# Patient Record
Sex: MALE | Race: WHITE
[De-identification: names, ages, dates, MRNs, and addresses within clinical notes are randomized per-mention and may not be internally consistent; named-entity substitution may affect disease eponyms.]

---

## 2021-02-17 ENCOUNTER — HOSPITAL ENCOUNTER (INPATIENT)
Dept: HOSPITAL 47 - EC | Age: 61
LOS: 8 days | Discharge: HOME | DRG: 854 | End: 2021-02-25
Attending: SURGERY | Admitting: SURGERY
Payer: COMMERCIAL

## 2021-02-17 VITALS — BODY MASS INDEX: 21.4 KG/M2

## 2021-02-17 DIAGNOSIS — Z82.49: ICD-10-CM

## 2021-02-17 DIAGNOSIS — K50.114: ICD-10-CM

## 2021-02-17 DIAGNOSIS — K40.30: ICD-10-CM

## 2021-02-17 DIAGNOSIS — K63.2: ICD-10-CM

## 2021-02-17 DIAGNOSIS — Z87.891: ICD-10-CM

## 2021-02-17 DIAGNOSIS — A41.51: Primary | ICD-10-CM

## 2021-02-17 DIAGNOSIS — K56.7: ICD-10-CM

## 2021-02-17 DIAGNOSIS — J98.11: ICD-10-CM

## 2021-02-17 DIAGNOSIS — Z20.822: ICD-10-CM

## 2021-02-17 DIAGNOSIS — K50.112: ICD-10-CM

## 2021-02-17 DIAGNOSIS — Z91.19: ICD-10-CM

## 2021-02-17 DIAGNOSIS — D50.9: ICD-10-CM

## 2021-02-17 DIAGNOSIS — Z53.31: ICD-10-CM

## 2021-02-17 DIAGNOSIS — J90: ICD-10-CM

## 2021-02-17 DIAGNOSIS — K21.9: ICD-10-CM

## 2021-02-17 DIAGNOSIS — E83.42: ICD-10-CM

## 2021-02-17 DIAGNOSIS — R18.8: ICD-10-CM

## 2021-02-17 DIAGNOSIS — Z91.011: ICD-10-CM

## 2021-02-17 DIAGNOSIS — Z82.3: ICD-10-CM

## 2021-02-17 LAB
ALBUMIN SERPL-MCNC: 3.9 G/DL (ref 3.5–5)
ALP SERPL-CCNC: 109 U/L (ref 38–126)
ALT SERPL-CCNC: 20 U/L (ref 4–49)
ANION GAP SERPL CALC-SCNC: 14 MMOL/L
APTT BLD: 22.7 SEC (ref 22–30)
AST SERPL-CCNC: 30 U/L (ref 17–59)
BASOPHILS # BLD AUTO: 0.1 K/UL (ref 0–0.2)
BASOPHILS NFR BLD AUTO: 1 %
BUN SERPL-SCNC: 17 MG/DL (ref 9–20)
CALCIUM SPEC-MCNC: 9.2 MG/DL (ref 8.4–10.2)
CHLORIDE SERPL-SCNC: 99 MMOL/L (ref 98–107)
CO2 SERPL-SCNC: 20 MMOL/L (ref 22–30)
EOSINOPHIL # BLD AUTO: 0.1 K/UL (ref 0–0.7)
EOSINOPHIL NFR BLD AUTO: 0 %
ERYTHROCYTE [DISTWIDTH] IN BLOOD BY AUTOMATED COUNT: 4.67 M/UL (ref 4.3–5.9)
ERYTHROCYTE [DISTWIDTH] IN BLOOD: 13.5 % (ref 11.5–15.5)
GLUCOSE SERPL-MCNC: 105 MG/DL (ref 74–99)
HCT VFR BLD AUTO: 37.8 % (ref 39–53)
HGB BLD-MCNC: 12.8 GM/DL (ref 13–17.5)
INR PPP: 1 (ref ?–1.2)
LYMPHOCYTES # SPEC AUTO: 1.2 K/UL (ref 1–4.8)
LYMPHOCYTES NFR SPEC AUTO: 9 %
MCH RBC QN AUTO: 27.3 PG (ref 25–35)
MCHC RBC AUTO-ENTMCNC: 33.7 G/DL (ref 31–37)
MCV RBC AUTO: 81 FL (ref 80–100)
MONOCYTES # BLD AUTO: 0.8 K/UL (ref 0–1)
MONOCYTES NFR BLD AUTO: 6 %
NEUTROPHILS # BLD AUTO: 10.7 K/UL (ref 1.3–7.7)
NEUTROPHILS NFR BLD AUTO: 83 %
PLATELET # BLD AUTO: 463 K/UL (ref 150–450)
POTASSIUM SERPL-SCNC: 4.2 MMOL/L (ref 3.5–5.1)
PROT SERPL-MCNC: 8.3 G/DL (ref 6.3–8.2)
PT BLD: 11.1 SEC (ref 9–12)
SODIUM SERPL-SCNC: 133 MMOL/L (ref 137–145)
WBC # BLD AUTO: 12.8 K/UL (ref 3.8–10.6)

## 2021-02-17 PROCEDURE — 83540 ASSAY OF IRON: CPT

## 2021-02-17 PROCEDURE — 86850 RBC ANTIBODY SCREEN: CPT

## 2021-02-17 PROCEDURE — 87186 SC STD MICRODIL/AGAR DIL: CPT

## 2021-02-17 PROCEDURE — 82728 ASSAY OF FERRITIN: CPT

## 2021-02-17 PROCEDURE — 86901 BLOOD TYPING SEROLOGIC RH(D): CPT

## 2021-02-17 PROCEDURE — 80053 COMPREHEN METABOLIC PANEL: CPT

## 2021-02-17 PROCEDURE — 87077 CULTURE AEROBIC IDENTIFY: CPT

## 2021-02-17 PROCEDURE — 96376 TX/PRO/DX INJ SAME DRUG ADON: CPT

## 2021-02-17 PROCEDURE — 84100 ASSAY OF PHOSPHORUS: CPT

## 2021-02-17 PROCEDURE — 87635 SARS-COV-2 COVID-19 AMP PRB: CPT

## 2021-02-17 PROCEDURE — 87075 CULTR BACTERIA EXCEPT BLOOD: CPT

## 2021-02-17 PROCEDURE — 99285 EMERGENCY DEPT VISIT HI MDM: CPT

## 2021-02-17 PROCEDURE — 83735 ASSAY OF MAGNESIUM: CPT

## 2021-02-17 PROCEDURE — 87070 CULTURE OTHR SPECIMN AEROBIC: CPT

## 2021-02-17 PROCEDURE — 74018 RADEX ABDOMEN 1 VIEW: CPT

## 2021-02-17 PROCEDURE — 85610 PROTHROMBIN TIME: CPT

## 2021-02-17 PROCEDURE — 93005 ELECTROCARDIOGRAM TRACING: CPT

## 2021-02-17 PROCEDURE — 36415 COLL VENOUS BLD VENIPUNCTURE: CPT

## 2021-02-17 PROCEDURE — 87205 SMEAR GRAM STAIN: CPT

## 2021-02-17 PROCEDURE — 87040 BLOOD CULTURE FOR BACTERIA: CPT

## 2021-02-17 PROCEDURE — 85652 RBC SED RATE AUTOMATED: CPT

## 2021-02-17 PROCEDURE — 74177 CT ABD & PELVIS W/CONTRAST: CPT

## 2021-02-17 PROCEDURE — 88307 TISSUE EXAM BY PATHOLOGIST: CPT

## 2021-02-17 PROCEDURE — 85025 COMPLETE CBC W/AUTO DIFF WBC: CPT

## 2021-02-17 PROCEDURE — 86140 C-REACTIVE PROTEIN: CPT

## 2021-02-17 PROCEDURE — 93306 TTE W/DOPPLER COMPLETE: CPT

## 2021-02-17 PROCEDURE — 96374 THER/PROPH/DIAG INJ IV PUSH: CPT

## 2021-02-17 PROCEDURE — 85730 THROMBOPLASTIN TIME PARTIAL: CPT

## 2021-02-17 PROCEDURE — 83550 IRON BINDING TEST: CPT

## 2021-02-17 PROCEDURE — 86900 BLOOD TYPING SEROLOGIC ABO: CPT

## 2021-02-17 PROCEDURE — 80048 BASIC METABOLIC PNL TOTAL CA: CPT

## 2021-02-17 RX ADMIN — CEFAZOLIN SCH MLS/HR: 330 INJECTION, POWDER, FOR SOLUTION INTRAMUSCULAR; INTRAVENOUS at 21:24

## 2021-02-17 RX ADMIN — HYDROMORPHONE HYDROCHLORIDE PRN MG: 1 INJECTION, SOLUTION INTRAMUSCULAR; INTRAVENOUS; SUBCUTANEOUS at 22:21

## 2021-02-17 NOTE — ED
General Adult HPI





- General


Chief complaint: Abdominal Pain


Stated complaint: Hernia


Time Seen by Provider: 02/17/21 17:46


Source: patient, RN notes reviewed


Mode of arrival: ambulatory


Limitations: no limitations





- History of Present Illness


Initial comments: 





Patient is a pleasant 60-year-old male presenting to the emergency Department 

with inguinal hernia.  Onset of symptoms was close to weeks ago.  Symptoms are 

intermittent however worsening.  Patient states symptoms have been much worse 

the past couple of days.  Patient states it is swollen and painful.  It 

increases with touch and movement.  No history of similar symptoms previously.  

Patient does have decreased appetite.  Patient is still passing gas.





- Related Data


                                    Allergies











Allergy/AdvReac Type Severity Reaction Status Date / Time


 


No Known Allergies Allergy   Verified 02/17/21 17:33














Review of Systems


ROS Statement: 


Those systems with pertinent positive or pertinent negative responses have been 

documented in the HPI.





ROS Other: All systems not noted in ROS Statement are negative.


Constitutional: Denies: fever


Eyes: Denies: eye pain


ENT: Denies: ear pain


Respiratory: Denies: cough


Cardiovascular: Denies: chest pain


Endocrine: Denies: fatigue


Gastrointestinal: Reports: as per HPI


Genitourinary: Reports: as per HPI


Musculoskeletal: Denies: back pain


Skin: Denies: rash


Neurological: Denies: weakness





Past Medical History


Additional Past Medical History / Comment(s): collitis, chrons


History of Any Multi-Drug Resistant Organisms: None Reported


Past Surgical History: No Surgical Hx Reported


Past Psychological History: No Psychological Hx Reported


Smoking Status: Former smoker


Past Alcohol Use History: None Reported


Past Drug Use History: None Reported





General Exam


Limitations: no limitations


General appearance: alert, in no apparent distress


Head exam: Present: normocephalic


Eye exam: Present: normal appearance


Neck exam: Present: normal inspection


Respiratory exam: Present: normal lung sounds bilaterally


Cardiovascular Exam: Present: regular rate, normal rhythm


GI/Abdominal exam: Present: soft.  Absent: distended, tenderness, guarding


 exam: Present: other (Left inguinal hernia with tenderness.  There is mild 

warmth and erythema laterally)


Extremities exam: Present: normal inspection


Back exam: Present: normal inspection


Neurological exam: Present: alert


Psychiatric exam: Present: normal affect, normal mood


Skin exam: Present: normal color





Course


                                   Vital Signs











  02/17/21 02/17/21





  17:30 19:37


 


Temperature 98.9 F 


 


Pulse Rate 115 H 99


 


Respiratory 18 18





Rate  


 


Blood Pressure 123/77 129/70


 


O2 Sat by Pulse 100 99





Oximetry  














- Reevaluation(s)


Reevaluation #1: 





02/17/21 17:59


Secondary to discomfort unable to reduce without pain medication at this time.





Medical Decision Making





- Medical Decision Making





Despite 2 doses of pain medication and 3 attempts at reduction, only a small 

amount of hernia was able to be reduced.  There is still approximately 50% or 

more present.  Patient is having discomfort with procedure.  Case was discussed 

with Dr. soria, who will admit, she is aware of concern of incarcerated vs 

strangulate hernia.  She would like IV antibiotics, 1 L normal saline, EKG and 

nothing by mouth after midnight





- Lab Data


Result diagrams: 


                                 02/17/21 18:18





                                 02/17/21 18:18


                                   Lab Results











  02/17/21 02/17/21 02/17/21 Range/Units





  18:18 18:18 18:18 


 


WBC  12.8 H    (3.8-10.6)  k/uL


 


RBC  4.67    (4.30-5.90)  m/uL


 


Hgb  12.8 L    (13.0-17.5)  gm/dL


 


Hct  37.8 L    (39.0-53.0)  %


 


MCV  81.0    (80.0-100.0)  fL


 


MCH  27.3    (25.0-35.0)  pg


 


MCHC  33.7    (31.0-37.0)  g/dL


 


RDW  13.5    (11.5-15.5)  %


 


Plt Count  463 H    (150-450)  k/uL


 


MPV  6.7    


 


Neutrophils %  83    %


 


Lymphocytes %  9    %


 


Monocytes %  6    %


 


Eosinophils %  0    %


 


Basophils %  1    %


 


Neutrophils #  10.7 H    (1.3-7.7)  k/uL


 


Lymphocytes #  1.2    (1.0-4.8)  k/uL


 


Monocytes #  0.8    (0-1.0)  k/uL


 


Eosinophils #  0.1    (0-0.7)  k/uL


 


Basophils #  0.1    (0-0.2)  k/uL


 


PT   11.1   (9.0-12.0)  sec


 


INR   1.0   (<1.2)  


 


APTT   22.7   (22.0-30.0)  sec


 


Sodium    133 L  (137-145)  mmol/L


 


Potassium    4.2  (3.5-5.1)  mmol/L


 


Chloride    99  ()  mmol/L


 


Carbon Dioxide    20 L  (22-30)  mmol/L


 


Anion Gap    14  mmol/L


 


BUN    17  (9-20)  mg/dL


 


Creatinine    0.92  (0.66-1.25)  mg/dL


 


Est GFR (CKD-EPI)AfAm    >90  (>60 ml/min/1.73 sqM)  


 


Est GFR (CKD-EPI)NonAf    >90  (>60 ml/min/1.73 sqM)  


 


Glucose    105 H  (74-99)  mg/dL


 


Calcium    9.2  (8.4-10.2)  mg/dL


 


Total Bilirubin    1.2  (0.2-1.3)  mg/dL


 


AST    30  (17-59)  U/L


 


ALT    20  (4-49)  U/L


 


Alkaline Phosphatase    109  ()  U/L


 


Total Protein    8.3 H  (6.3-8.2)  g/dL


 


Albumin    3.9  (3.5-5.0)  g/dL














- Radiology Data


Radiology results: image reviewed (Abdominal x-ray shows nonacute abdomen)





Disposition


Clinical Impression: 


 Incarcerated hernia





Disposition: ADMITTED AS IP TO THIS [facility]


Condition: Serious


Is patient prescribed a controlled substance at d/c from ED?: No


Referrals: 


Nonstaff,Physician [Primary Care Provider] - 1-2 days


Decision Time: 19:40

## 2021-02-17 NOTE — XR
EXAMINATION TYPE: XR abdomen 1V

 

DATE OF EXAM: 2/17/2021

 

COMPARISON: NONE

 

HISTORY: Hernia. Abdominal pain.

 

TECHNIQUE: 2 views

 

FINDINGS: 2 views upright were obtained and show no sign of intestinal obstruction or pneumoperitoneu
m. Fecal pattern is normal. Lung bases are clear. There are no pathologic calcifications.

 

IMPRESSION: Nonacute abdomen.

## 2021-02-18 LAB
ANION GAP SERPL CALC-SCNC: 9.9 MMOL/L (ref 4–12)
BASOPHILS # BLD AUTO: 0.07 X 10*3/UL (ref 0–0.1)
BASOPHILS NFR BLD AUTO: 0.5 %
BUN SERPL-SCNC: 16 MG/DL (ref 9–27)
BUN/CREAT SERPL: 17.78 RATIO (ref 12–20)
CALCIUM SPEC-MCNC: 8 MG/DL (ref 8.7–10.3)
CHLORIDE SERPL-SCNC: 103 MMOL/L (ref 96–109)
CO2 SERPL-SCNC: 23.1 MMOL/L (ref 21.6–31.8)
EOSINOPHIL # BLD AUTO: 0.09 X 10*3/UL (ref 0.04–0.35)
EOSINOPHIL NFR BLD AUTO: 0.7 %
ERYTHROCYTE [DISTWIDTH] IN BLOOD BY AUTOMATED COUNT: 3.77 X 10*6/UL (ref 4.4–5.6)
ERYTHROCYTE [DISTWIDTH] IN BLOOD: 13.7 % (ref 11.5–14.5)
FERRITIN SERPL-MCNC: 162.2 NG/ML (ref 22–322)
GLUCOSE SERPL-MCNC: 98 MG/DL (ref 70–110)
HCT VFR BLD AUTO: 31.4 % (ref 39.6–50)
HGB BLD-MCNC: 10.2 G/DL (ref 13–17)
IRON SERPL-MCNC: 11 UG/DL (ref 65–175)
LYMPHOCYTES # SPEC AUTO: 1.14 X 10*3/UL (ref 0.9–5)
LYMPHOCYTES NFR SPEC AUTO: 8.5 %
MCH RBC QN AUTO: 27.1 PG (ref 27–32)
MCHC RBC AUTO-ENTMCNC: 32.5 G/DL (ref 32–37)
MCV RBC AUTO: 83.3 FL (ref 80–97)
MONOCYTES # BLD AUTO: 1.16 X 10*3/UL (ref 0.2–1)
MONOCYTES NFR BLD AUTO: 8.7 %
NEUTROPHILS # BLD AUTO: 10.88 X 10*3/UL (ref 1.8–7.7)
NEUTROPHILS NFR BLD AUTO: 81.2 %
PLATELET # BLD AUTO: 367 X 10*3/UL (ref 140–440)
POTASSIUM SERPL-SCNC: 4.2 MMOL/L (ref 3.5–5.5)
SODIUM SERPL-SCNC: 136 MMOL/L (ref 135–145)
TIBC SERPL-MCNC: 235 UG/DL (ref 228–460)
WBC # BLD AUTO: 13.4 X 10*3/UL (ref 4.5–10)

## 2021-02-18 RX ADMIN — CEFAZOLIN SCH: 330 INJECTION, POWDER, FOR SOLUTION INTRAMUSCULAR; INTRAVENOUS at 22:39

## 2021-02-18 RX ADMIN — ENOXAPARIN SODIUM SCH MG: 30 INJECTION SUBCUTANEOUS at 08:19

## 2021-02-18 RX ADMIN — CEFAZOLIN SCH MLS/HR: 330 INJECTION, POWDER, FOR SOLUTION INTRAMUSCULAR; INTRAVENOUS at 13:00

## 2021-02-18 RX ADMIN — GABAPENTIN SCH MG: 300 CAPSULE ORAL at 22:40

## 2021-02-18 RX ADMIN — AMPICILLIN SODIUM AND SULBACTAM SODIUM SCH MLS/HR: 1; .5 INJECTION, POWDER, FOR SOLUTION INTRAMUSCULAR; INTRAVENOUS at 08:19

## 2021-02-18 RX ADMIN — CEFAZOLIN SCH: 330 INJECTION, POWDER, FOR SOLUTION INTRAMUSCULAR; INTRAVENOUS at 03:24

## 2021-02-18 RX ADMIN — METRONIDAZOLE SCH MLS/HR: 500 INJECTION, SOLUTION INTRAVENOUS at 17:04

## 2021-02-18 RX ADMIN — SODIUM FERRIC GLUCONATE COMPLEX SCH MLS/HR: 12.5 INJECTION INTRAVENOUS at 23:06

## 2021-02-18 RX ADMIN — METRONIDAZOLE SCH MLS/HR: 500 INJECTION, SOLUTION INTRAVENOUS at 23:46

## 2021-02-18 RX ADMIN — KETOROLAC TROMETHAMINE SCH MG: 15 INJECTION, SOLUTION INTRAMUSCULAR; INTRAVENOUS at 23:44

## 2021-02-18 RX ADMIN — PIPERACILLIN AND TAZOBACTAM SCH MLS/HR: 3; .375 INJECTION, POWDER, FOR SOLUTION INTRAVENOUS at 12:58

## 2021-02-18 RX ADMIN — HYDROMORPHONE HYDROCHLORIDE PRN MG: 1 INJECTION, SOLUTION INTRAMUSCULAR; INTRAVENOUS; SUBCUTANEOUS at 22:40

## 2021-02-18 RX ADMIN — METRONIDAZOLE SCH MLS/HR: 500 INJECTION, SOLUTION INTRAVENOUS at 11:29

## 2021-02-18 RX ADMIN — ACETAMINOPHEN SCH MG: 500 TABLET ORAL at 11:53

## 2021-02-18 RX ADMIN — KETOROLAC TROMETHAMINE SCH MG: 15 INJECTION, SOLUTION INTRAMUSCULAR; INTRAVENOUS at 17:35

## 2021-02-18 RX ADMIN — ACETAMINOPHEN SCH MG: 500 TABLET ORAL at 17:35

## 2021-02-18 RX ADMIN — ACETAMINOPHEN SCH: 500 TABLET ORAL at 00:03

## 2021-02-18 RX ADMIN — ACETAMINOPHEN SCH: 500 TABLET ORAL at 05:04

## 2021-02-18 RX ADMIN — KETOROLAC TROMETHAMINE SCH MG: 15 INJECTION, SOLUTION INTRAMUSCULAR; INTRAVENOUS at 05:07

## 2021-02-18 RX ADMIN — TAMSULOSIN HYDROCHLORIDE SCH MG: 0.4 CAPSULE ORAL at 08:19

## 2021-02-18 RX ADMIN — ACETAMINOPHEN SCH: 500 TABLET ORAL at 23:52

## 2021-02-18 RX ADMIN — PANTOPRAZOLE SODIUM SCH MG: 40 INJECTION, POWDER, FOR SOLUTION INTRAVENOUS at 08:18

## 2021-02-18 RX ADMIN — KETOROLAC TROMETHAMINE SCH MG: 15 INJECTION, SOLUTION INTRAMUSCULAR; INTRAVENOUS at 11:54

## 2021-02-18 RX ADMIN — GABAPENTIN SCH MG: 300 CAPSULE ORAL at 08:19

## 2021-02-18 RX ADMIN — AMPICILLIN SODIUM AND SULBACTAM SODIUM SCH MLS/HR: 1; .5 INJECTION, POWDER, FOR SOLUTION INTRAMUSCULAR; INTRAVENOUS at 02:52

## 2021-02-18 RX ADMIN — KETOROLAC TROMETHAMINE SCH MG: 15 INJECTION, SOLUTION INTRAMUSCULAR; INTRAVENOUS at 00:07

## 2021-02-18 RX ADMIN — PIPERACILLIN AND TAZOBACTAM SCH MLS/HR: 3; .375 INJECTION, POWDER, FOR SOLUTION INTRAVENOUS at 19:22

## 2021-02-18 RX ADMIN — GABAPENTIN SCH MG: 300 CAPSULE ORAL at 16:08

## 2021-02-18 NOTE — P.GSHP
History of Present Illness


H&P Date: 02/18/21








CHIEF COMPLAINT: Left groin swelling with redness





HISTORY OF PRESENT ILLNESS: The patient is a 60-year-old male with over 40+ year

history of Crohn's disease.  He reports in the last 2 weeks having tenderness 

along the left groin.  He denied any noticeable swelling or bulge along the left

groin.  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  He is from [facility].  He has seen his primary care 

doctor the last 2 days regarding the redness along the left groin.  Upon 

inspection, patient was immediately advised to go to the emergency room for 

surgical intervention.  In the ER, reduction of a left groin hernia was 

attempted but unsuccessful.  Patient denies any previous history of abdominal 

wall hernias.  Denies any abdominal surgeries.  He reports being on infusion 

therapies over 30-20 years ago which had been discontinued.  He has been using 

homeopathic medications to control his Crohn's disease.  He does not follow up 

with a gastroenterologist.  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

 Denies any recent colonoscopy in over 22 years.  He has family history of heart

disease including strokes in his grandparents and father.  Denies any active 

chest pain.  He denies taking any home medications.  He is admitted for in

[facility] with a left inguinal hernia





PAST MEDICAL HISTORY: 


See list and reviewed





PAST SURGICAL HISTORY: 


See list and reviewed





MEDICATIONS: 


See list and reviewed





ALLERGIES: 


See list and reviewed





SOCIAL HISTORY: See list and reviewed





FAMILY HISTORY:  See list and reviewed





REVIEW OF ORGAN SYSTEMS:


CONSTITUTIONAL:  Inpatient, fevers over 100.0


EYES: Denies any trouble with vision. No glasses.


HEENT:  No difficulties with hearing. No nosebleeds.  No difficulty swallowing. 


RESPIRATORY:  Denies pneumonia. Denies any troubles with breathing or dyspnea on

exertion. 


CARDIOVASCULAR:  Denies any chest pain, palpitations, or recent heart attacks. 


GASTROINTESTINAL: Has Crohn's disease over 40 years treated with homeopathic 

medications.  No recent colonoscopy 22 years.


GENITOURINARY:  Denies any blood in urine or increased urinary frequency.  


NEUROLOGICAL:  Denies any numbness or tingling along the distal extremities. No 

seizure disorders or headaches.


MUSCULOSKELETAL:  Denies any back pain, stiffness or joint arthritis. 


SKIN: No current skin cancer. No rash.


PSYCHIATRIC:  Denies current depression or suicidal thoughts.


ENDOCRINE:  Denies current thyroid disorders. Denies any blood sugar glucose 

intolerance.


HEME/LYMPHATIC: Denies any lumps and bumps around the neck. No recent deep 

venous thrombosis.


ALLERGY/IMMUNOLOGY:  No immunoglobulin therapy. No immune deficiencies.


BREAST: Denies current breast lumps, pain or nipple discharge.





PHYSICAL EXAM:


VITALS: Reviewed


CONSTITUTIONAL:  Well developed and in no acute distress. 


EYES:  Conjuctivae without sclera icterus. Pupils are equally round and reactive

to light. Extraocular movements grossly intact. 


HEAD, EARS, NOSE, THROAT: Moist buccal mucosa. Head is atraumatic, 

normocephalic. Hears conversational speech. No nasal drainage. 


NECK:  Supple. No JV distention. No thyroidomegaly.


RESPIRATORY:  Non-labored respirations and equal bilateral excursions. No gross 

wheezes. 


CARDIOVASCULAR:  Regular rate and rhythm.  Extremities without edema. Palpable 

2+ radial pulses.


ABDOMEN:  Soft.  Swelling along left groin erythematous of 10 x 4 cm.  No 

peritonitis.


LYMPH: No neck lymphadenopathy. 


MUSCULOSKELETAL: Nail and fingers with good capillary refill.  Has clubbing 

along the fingers. 


SKIN:  Warm and well perfused with good skin turgor.


NEUROLOGIC: Cranial nerves II through XII grossly intact. Sensation upper and 

extremities intact. No focal or lateralizing signs. 


PSYCH:  Appropriate affect.  Alert and oriented to person, place and time. 

Displays appropriate insight.





CLINCAL LABS: Reviewed.  WBC elevated from over 12,000-13,000 today.  Hemoglobin

down from 12.6 to over 10.0.  Coronavirus negative.





STUDIES: Plain abdominal films to review reviewed demonstrating barstool on the 

right colon.  No free air.  Localized small bowel dilation of the left upper 

quadrant.  Air is within the colon.





EKG: Normal sinus rhythm.





ASSESSMENT: 


1.  Left lower quadrant abdominal pain with left groin swelling 


2.  Crohn's disease untreated





PLAN: 


1.  Recommend stat CT of the abdomen pelvis is presentation of routine inguinal 

hernia is complicated with uncontrolled Crohn's disease with fevers and leukocy

tosis


2.  Recommend stat iron panel including CRP for iron deficiency anemia and 

active Crohn's disease.


3.  GI consultation for uncontrolled Crohn's disease


4.  Overall presentation complicated with active Crohn's including leukocytosis 

and moderate sized swelling with erythema along the left groin questionable for 

complicated Crohn's disease with abscess


5.  Full inpatient admission described to evaluate for complicated Crohn's 

disease with left lower quadrant abdominal pain and questionable intra-abdominal

abscess





Past Medical History


Additional Past Medical History / Comment(s): collitis, chrons


History of Any Multi-Drug Resistant Organisms: None Reported


Past Surgical History: No Surgical Hx Reported


Past Psychological History: No Psychological Hx Reported


Smoking Status: Never smoker


Past Alcohol Use History: Occasional


Past Drug Use History: None Reported





Medications and Allergies


                                Home Medications











 Medication  Instructions  Recorded  Confirmed  Type


 


No Known Home Medications  02/17/21 02/17/21 History








                                    Allergies











Allergy/AdvReac Type Severity Reaction Status Date / Time


 


Milk Containing Products Allergy  Unknown Verified 02/17/21 19:48





[Dairy]     














Surgical - Exam


                                   Vital Signs











Temp Pulse Resp BP Pulse Ox


 


 98.9 F   115 H  18   123/77   100 


 


 02/17/21 17:30  02/17/21 17:30  02/17/21 17:30  02/17/21 17:30  02/17/21 17:30














Results





- Labs





                                 02/18/21 06:03





                                 02/18/21 06:03


                  Abnormal Lab Results - Last 24 Hours (Table)











  02/17/21 02/17/21 02/18/21 Range/Units





  18:18 18:18 06:03 


 


WBC  12.8 H   13.40 H  (3.8-10.6)  k/uL


 


RBC    3.77 L  (4.40-5.60)  X 10*6/uL


 


Hgb  12.8 L   10.2 L  (13.0-17.5)  gm/dL


 


Hct  37.8 L   31.4 L  (39.0-53.0)  %


 


Plt Count  463 H    (150-450)  k/uL


 


MPV    9.3 L  (9.5-12.2)  fL


 


Immature Gran #    0.06 H  (0.00-0.04)  X 10*3/uL


 


Neutrophils #  10.7 H   10.88 H  (1.3-7.7)  k/uL


 


Monocytes #    1.16 H  (0.20-1.00)  X 10*3/uL


 


Sodium   133 L   (137-145)  mmol/L


 


Carbon Dioxide   20 L   (22-30)  mmol/L


 


Glucose   105 H   (74-99)  mg/dL


 


Calcium     (8.7-10.3)  mg/dL


 


Total Protein   8.3 H   (6.3-8.2)  g/dL














  02/18/21 Range/Units





  06:03 


 


WBC   (3.8-10.6)  k/uL


 


RBC   (4.40-5.60)  X 10*6/uL


 


Hgb   (13.0-17.5)  gm/dL


 


Hct   (39.0-53.0)  %


 


Plt Count   (150-450)  k/uL


 


MPV   (9.5-12.2)  fL


 


Immature Gran #   (0.00-0.04)  X 10*3/uL


 


Neutrophils #   (1.3-7.7)  k/uL


 


Monocytes #   (0.20-1.00)  X 10*3/uL


 


Sodium   (137-145)  mmol/L


 


Carbon Dioxide   (22-30)  mmol/L


 


Glucose   (74-99)  mg/dL


 


Calcium  8.0 L  (8.7-10.3)  mg/dL


 


Total Protein   (6.3-8.2)  g/dL








                                 Diabetes panel











  02/17/21 02/18/21 Range/Units





  18:18 06:03 


 


Sodium  133 L  136  (137-145)  mmol/L


 


Potassium  4.2  4.2  (3.5-5.1)  mmol/L


 


Chloride  99  103  ()  mmol/L


 


Carbon Dioxide  20 L  23.1  (22-30)  mmol/L


 


BUN  17  16.0  (9-20)  mg/dL


 


Creatinine  0.92  0.9  (0.66-1.25)  mg/dL


 


Glucose  105 H  98  (74-99)  mg/dL


 


Calcium  9.2  8.0 L  (8.4-10.2)  mg/dL


 


AST  30   (17-59)  U/L


 


ALT  20   (4-49)  U/L


 


Alkaline Phosphatase  109   ()  U/L


 


Total Protein  8.3 H   (6.3-8.2)  g/dL


 


Albumin  3.9   (3.5-5.0)  g/dL








                                  Calcium panel











  02/17/21 02/18/21 Range/Units





  18:18 06:03 


 


Calcium  9.2  8.0 L  (8.4-10.2)  mg/dL


 


Albumin  3.9   (3.5-5.0)  g/dL








                                 Pituitary panel











  02/17/21 02/18/21 Range/Units





  18:18 06:03 


 


Sodium  133 L  136  (137-145)  mmol/L


 


Potassium  4.2  4.2  (3.5-5.1)  mmol/L


 


Chloride  99  103  ()  mmol/L


 


Carbon Dioxide  20 L  23.1  (22-30)  mmol/L


 


BUN  17  16.0  (9-20)  mg/dL


 


Creatinine  0.92  0.9  (0.66-1.25)  mg/dL


 


Glucose  105 H  98  (74-99)  mg/dL


 


Calcium  9.2  8.0 L  (8.4-10.2)  mg/dL








                                  Adrenal panel











  02/17/21 02/18/21 Range/Units





  18:18 06:03 


 


Sodium  133 L  136  (137-145)  mmol/L


 


Potassium  4.2  4.2  (3.5-5.1)  mmol/L


 


Chloride  99  103  ()  mmol/L


 


Carbon Dioxide  20 L  23.1  (22-30)  mmol/L


 


BUN  17  16.0  (9-20)  mg/dL


 


Creatinine  0.92  0.9  (0.66-1.25)  mg/dL


 


Glucose  105 H  98  (74-99)  mg/dL


 


Calcium  9.2  8.0 L  (8.4-10.2)  mg/dL


 


Total Bilirubin  1.2   (0.2-1.3)  mg/dL


 


AST  30   (17-59)  U/L


 


ALT  20   (4-49)  U/L


 


Alkaline Phosphatase  109   ()  U/L


 


Total Protein  8.3 H   (6.3-8.2)  g/dL


 


Albumin  3.9   (3.5-5.0)  g/dL














Assessment and Plan


(1) Left lower quadrant pain


Current Visit: Yes   Status: Acute   Code(s): R10.32 - LEFT LOWER QUADRANT PAIN 

  SNOMED Code(s): 905364727


   





(2) Crohn's disease


Current Visit: Yes   Status: Acute   Code(s): K50.90 - CROHN'S DISEASE, 

UNSPECIFIED, WITHOUT COMPLICATIONS   SNOMED Code(s): 10403615


   





(3) Leukocytosis


Current Visit: Yes   Status: Acute   Code(s): D72.829 - ELEVATED WHITE BLOOD 

CELL COUNT, UNSPECIFIED   SNOMED Code(s): 499986742


   





(4) Anemia, iron deficiency


Current Visit: Yes   Status: Acute   Code(s): D50.9 - IRON DEFICIENCY ANEMIA, 

UNSPECIFIED   SNOMED Code(s): 50046248

## 2021-02-18 NOTE — P.HPADDEND
H&P Addendum


H&P Addendum Date: 02/18/21





CT of the abdomen and pelvis independently reviewed demonstrated no evidence of 

left inguinal hernia.  Moderate size subcutaneous abscess with fistulous tract 

from the colon to the left lower abdominal wall identified.  Will re-evaluate 

for open drainage of abdominal wall abscess with robotic colostomy creation.





Patient's case also discussed with gastroenterologist for high recurrence of 

abscess and setting of Crohn's disease with fistula.





Patient also reports obstructive symptoms ongoing for more than 2 weeks.  

Stricture also identified of sigmoid colon.  Do recommend proceeding with 

robotic possible open colostomy creation with resection of sigmoid colon and 

drainage of abdominal wall abscess

## 2021-02-18 NOTE — CONS
CONSULTATION



DATE OF DICTATION:

02/18/2021



REASON FOR CONSULTATION:

History of Crohn's disease and abdominal wall abscess.



HISTORY OF PRESENT ILLNESS:

The patient is a 60-year-old pleasant white male with history of Crohn's disease

diagnosed approximately 40 years ago at [facility].  The patient states that

he was maintained on Azulfidine for 20 years and subsequently was changed to Asacol

that he took for last 15 years, but he quit taking about 5 years ago.  He normally has

about 6 to 8 bowel movements daily.  He started experiencing some redness and swelling

in the left groin area for the last 2 weeks, which continued to progressively get worse

and hence he came into the emergency room and subsequently admitted to the hospital for

further evaluation. He did have a CT of the abdomen and pelvis done this morning after

he was seen by Dr. Morrissey which revealed a 7.8 x 3.8 cm anterior abdominal wall

abscess involving the left groin area extending into the rectus abdominis musculature.

There was also wall thickening of the sigmoid colon and possibility of diverticulitis

versus neoplasm could not be excluded.  There was also a focus of air identified in the

surrounding with inflammatory changes extending into the sigmoid colon suspicious for a

fistula.



His last colonoscopy was about 22 years ago.  He did not have any flare up of his Crohn

disease in the last 20 years.  He usually has bowel movements anywhere from 6 to 8 a

day which are loose and soft in consistency.  No rectal bleeding.



PAST MEDICAL HISTORY:

His past medical history is significant for Crohn's disease that was diagnosed 40 years

ago, colonoscopy 22 years ago.



MEDICATIONS AT HOME:

None.



ALLERGIES:

No known drug allergies.



SOCIAL HISTORY:

No smoking. No alcohol use.



FAMILY HISTORY:

Unremarkable.



REVIEW OF SYSTEMS:

CARDIOPULMONARY: No chest pain or shortness of breath.

GENITOURINARY:  No dysuria or hematuria.

MUSCULOSKELETAL: Unremarkable.

SKIN: Unremarkable.

ENDOCRINE: Unremarkable.

PSYCHIATRIC: Unremarkable.

NEUROLOGY: Unremarkable.

CONSTITUTIONAL: Low-grade fever, but no chills or night sweats.

HEMATOLOGY: Unremarkable.



PHYSICAL EXAMINATION:

Blood pressure is 98/59, pulse rate 62, temperature 97.8, T-max 100.7.

HEENT EXAMINATION:  Unremarkable. Conjunctivae pink. Sclerae anicteric.  Oral cavity,

no lesions.

NECK: No JVD or lymph node enlargement.

CHEST: Was clear to auscultation.

HEART:  Regular rate and rhythm.

ABDOMEN:  Soft. There was severe tenderness with redness and swelling of the left groin

area. Rest of the abdomen was benign.

EXTREMITIES: No pedal edema.

SKIN:  No rashes.

NEUROLOGIC:  Alert and oriented x3.  No focal deficits.



LABS:

WBC 12.8, hemoglobin 12.8, platelets 463.  Today hemoglobin 13.4, hemoglobin 10.2.

Sedimentation rate 91.  Basic metabolic panel is within normal limits.  CRP is 227.

Coronavirus PCR is negative.



IMPRESSION:

1. CT of the abdomen and pelvis showed abdominal wall abscess in the left groin area

    measuring 7.8 x 3.8 cm in size with adjacent sigmoid wall thickening suspicious for

    sigmoid diverticulitis with a fistulous tract.  Possibility of neoplasm could not

    be excluded.  Last colonoscopy was 22 years ago.

2. History of Crohn's disease, Crohn's colitis, that has been in clinical remission

    for the last 22 years.  Last colonoscopy was 22 years ago.  Patient presently on no

    maintenance medications.



RECOMMENDATIONS:

1. Continue with broad-spectrum antibiotics.

2. I had a lengthy discussion with Dr. Morrissey and at this time I recommend surgical

    intervention with drainage of abscess as well as sigmoid colon resection with

    possible colostomy.

3. No plans on doing any colonoscopy intervention at the present time.

4. Will continue to follow with you closely.

Thank you for this consultation.





MMODL / IJN: [id number] / Job#: [id number]

## 2021-02-18 NOTE — CT
EXAMINATION TYPE: CT abdomen pelvis w con

 

DATE OF EXAM: 2/18/2021

 

COMPARISON: None

 

HISTORY: LLQ swelling and pain

 

CT DLP: 914 mGycm

 

CONTRAST: 

CT scan of the abdomen and pelvis is performed without Oral Contrast and with IV Contrast, patient in
jected with 100 mL of Isovue 300.

 

FINDINGS: 

LUNG BASES-: No visible nodule.  No infiltrate. 

 

LIVER/GB:   No calcified gallstones.  No space occupying hepatic lesion. Biliary tree is of normal ca
liber. 

 

PANCREAS:  No inflammation.  No distinct mass. 

 

SPLEEN:  No splenic enlargement.  No lesion seen. 

 

ADRENALS:  No nodule.  No thickening. 

 

KIDNEYS/BLADDER:  No hydronephrosis.  No nephrolithiasis.  No distinct renal mass.  Urinary bladder g
rossly unremarkable. 

 

BOWEL: There is a left lower quadrant anterior abdominal wall abscess noted which measures approximat
ely 3.8 cm in AP dimension by 7.8 cm in craniocaudal dimension by 6.0 there is edema of the adjacent 
rectus abdominis musculature. There is also an intra-abdominal there is also wall thickening of the s
igmoid colon. Neoplasm not excluded. Moderate fecal stasis. Focus of air identified in surrounding in
flammatory change seen on image 73 there is inflammatory change of the sigmoid colon with apparent tr
acking to the undersurface of the rectus abdominis. The findings are likely related to diverticulitis
 with fistulous tract. Fistulous tract can be seen on the coronal image on #27.

 

GENITAL ORGANS:  No gross abnormality. 

 

LYMPH NODES:  No greater than 1cm abdominal or pelvic lymph nodes are appreciated.

 

AORTA: No significant abnormality. 

 

OSSEOUS STRUCTURES:  No significant abnormality is seen. 

 

OTHER:  No significant additional abnormality is seen. 

 

IMPRESSION: 

1. Sigmoid diverticulitis with fistulous tract. There is an abscess along the undersurface of the ant
erior abdominal wall on the left measuring approximately 2.5 cm. Abscess extends into the anterior ab
dominal wall and along the anterior portion of the left anterior abdominal wall as discussed above.

## 2021-02-19 LAB
ANION GAP SERPL CALC-SCNC: 8.2 MMOL/L (ref 4–12)
BASOPHILS # BLD AUTO: 0.05 X 10*3/UL (ref 0–0.1)
BASOPHILS NFR BLD AUTO: 0.4 %
BUN SERPL-SCNC: 16 MG/DL (ref 9–27)
BUN/CREAT SERPL: 17.78 RATIO (ref 12–20)
CALCIUM SPEC-MCNC: 7.8 MG/DL (ref 8.7–10.3)
CHLORIDE SERPL-SCNC: 110 MMOL/L (ref 96–109)
CO2 SERPL-SCNC: 19.8 MMOL/L (ref 21.6–31.8)
EOSINOPHIL # BLD AUTO: 0.02 X 10*3/UL (ref 0.04–0.35)
EOSINOPHIL NFR BLD AUTO: 0.1 %
ERYTHROCYTE [DISTWIDTH] IN BLOOD BY AUTOMATED COUNT: 3.79 X 10*6/UL (ref 4.4–5.6)
ERYTHROCYTE [DISTWIDTH] IN BLOOD: 13.9 % (ref 11.5–14.5)
GLUCOSE SERPL-MCNC: 92 MG/DL (ref 70–110)
HCT VFR BLD AUTO: 32.7 % (ref 39.6–50)
HGB BLD-MCNC: 10 G/DL (ref 13–17)
LYMPHOCYTES # SPEC AUTO: 0.5 X 10*3/UL (ref 0.9–5)
LYMPHOCYTES NFR SPEC AUTO: 3.7 %
MCH RBC QN AUTO: 26.4 PG (ref 27–32)
MCHC RBC AUTO-ENTMCNC: 30.6 G/DL (ref 32–37)
MCV RBC AUTO: 86.3 FL (ref 80–97)
MONOCYTES # BLD AUTO: 0.6 X 10*3/UL (ref 0.2–1)
MONOCYTES NFR BLD AUTO: 4.4 %
NEUTROPHILS # BLD AUTO: 12.28 X 10*3/UL (ref 1.8–7.7)
NEUTROPHILS NFR BLD AUTO: 90.9 %
PLATELET # BLD AUTO: 348 X 10*3/UL (ref 140–440)
POTASSIUM SERPL-SCNC: 4 MMOL/L (ref 3.5–5.5)
SODIUM SERPL-SCNC: 138 MMOL/L (ref 135–145)
WBC # BLD AUTO: 13.52 X 10*3/UL (ref 4.5–10)

## 2021-02-19 PROCEDURE — 0DJD4ZZ INSPECTION OF LOWER INTESTINAL TRACT, PERCUTANEOUS ENDOSCOPIC APPROACH: ICD-10-PCS

## 2021-02-19 PROCEDURE — 0DTN0ZZ RESECTION OF SIGMOID COLON, OPEN APPROACH: ICD-10-PCS

## 2021-02-19 PROCEDURE — 0D1M0Z4 BYPASS DESCENDING COLON TO CUTANEOUS, OPEN APPROACH: ICD-10-PCS

## 2021-02-19 RX ADMIN — ONDANSETRON PRN MG: 2 INJECTION INTRAMUSCULAR; INTRAVENOUS at 05:29

## 2021-02-19 RX ADMIN — METRONIDAZOLE SCH MLS: 500 INJECTION, SOLUTION INTRAVENOUS at 11:35

## 2021-02-19 RX ADMIN — KETOROLAC TROMETHAMINE SCH MG: 15 INJECTION, SOLUTION INTRAMUSCULAR; INTRAVENOUS at 05:03

## 2021-02-19 RX ADMIN — TAMSULOSIN HYDROCHLORIDE SCH MG: 0.4 CAPSULE ORAL at 08:51

## 2021-02-19 RX ADMIN — PIPERACILLIN AND TAZOBACTAM SCH MLS/HR: 3; .375 INJECTION, POWDER, FOR SOLUTION INTRAVENOUS at 22:16

## 2021-02-19 RX ADMIN — METRONIDAZOLE SCH MLS/HR: 500 INJECTION, SOLUTION INTRAVENOUS at 05:05

## 2021-02-19 RX ADMIN — CEFAZOLIN SCH: 330 INJECTION, POWDER, FOR SOLUTION INTRAMUSCULAR; INTRAVENOUS at 05:08

## 2021-02-19 RX ADMIN — GABAPENTIN SCH MG: 300 CAPSULE ORAL at 08:51

## 2021-02-19 RX ADMIN — KETOROLAC TROMETHAMINE SCH: 15 INJECTION, SOLUTION INTRAMUSCULAR; INTRAVENOUS at 20:30

## 2021-02-19 RX ADMIN — SODIUM CHLORIDE PRN MLS: 9 INJECTION, SOLUTION INTRAVENOUS at 18:59

## 2021-02-19 RX ADMIN — ACETAMINOPHEN SCH: 500 TABLET ORAL at 05:50

## 2021-02-19 RX ADMIN — PANTOPRAZOLE SODIUM SCH MG: 40 INJECTION, POWDER, FOR SOLUTION INTRAVENOUS at 08:51

## 2021-02-19 RX ADMIN — ACETAMINOPHEN SCH: 500 TABLET ORAL at 19:53

## 2021-02-19 RX ADMIN — CEFAZOLIN SCH MLS/HR: 330 INJECTION, POWDER, FOR SOLUTION INTRAMUSCULAR; INTRAVENOUS at 20:55

## 2021-02-19 RX ADMIN — ACETAMINOPHEN SCH: 500 TABLET ORAL at 20:30

## 2021-02-19 RX ADMIN — KETOROLAC TROMETHAMINE SCH: 15 INJECTION, SOLUTION INTRAMUSCULAR; INTRAVENOUS at 19:54

## 2021-02-19 RX ADMIN — ENOXAPARIN SODIUM SCH: 30 INJECTION SUBCUTANEOUS at 08:50

## 2021-02-19 RX ADMIN — METRONIDAZOLE SCH: 500 INJECTION, SOLUTION INTRAVENOUS at 20:30

## 2021-02-19 RX ADMIN — HYDROMORPHONE HYDROCHLORIDE PRN MG: 1 INJECTION, SOLUTION INTRAMUSCULAR; INTRAVENOUS; SUBCUTANEOUS at 21:08

## 2021-02-19 RX ADMIN — PIPERACILLIN AND TAZOBACTAM SCH MLS/HR: 3; .375 INJECTION, POWDER, FOR SOLUTION INTRAVENOUS at 03:39

## 2021-02-19 RX ADMIN — PIPERACILLIN AND TAZOBACTAM SCH MLS: 3; .375 INJECTION, POWDER, FOR SOLUTION INTRAVENOUS at 13:10

## 2021-02-19 RX ADMIN — ACETAMINOPHEN SCH MG: 500 TABLET ORAL at 23:50

## 2021-02-19 RX ADMIN — CEFAZOLIN SCH: 330 INJECTION, POWDER, FOR SOLUTION INTRAMUSCULAR; INTRAVENOUS at 19:54

## 2021-02-19 RX ADMIN — GABAPENTIN SCH: 300 CAPSULE ORAL at 19:54

## 2021-02-19 RX ADMIN — METRONIDAZOLE SCH MLS/HR: 500 INJECTION, SOLUTION INTRAVENOUS at 23:50

## 2021-02-19 RX ADMIN — HYDROMORPHONE HYDROCHLORIDE PRN MG: 1 INJECTION, SOLUTION INTRAMUSCULAR; INTRAVENOUS; SUBCUTANEOUS at 05:07

## 2021-02-19 RX ADMIN — SODIUM FERRIC GLUCONATE COMPLEX SCH MLS/HR: 12.5 INJECTION INTRAVENOUS at 20:55

## 2021-02-19 RX ADMIN — GABAPENTIN SCH MG: 300 CAPSULE ORAL at 20:55

## 2021-02-19 NOTE — P.ANPRN
Procedure Note - Anesthesia





- Epidural/Spinal


  ** Epidural Continuous


Time Out Performed: Yes


Date of Procedure: 02/19/21


Procedure Start Time: 18:33


Procedure Stop Time: 18:42


Location of Patient: Phase I


Indication: Acute Post-Operative Pain, Requested by Surgeon


Sedation Type: Awake


Preparation: Sterile Dressing


Position: Left Lateral


Catheter: Indwelling


Needle Guage: 18


Injectate: Test Dose Lidocaine1.5% w/1:200,000 epi


Blood Aspirated: No


Pain Paresthesia on Injection Noted: No


Events: Uneventful and Well Tolerated (L2-3 Level)

## 2021-02-19 NOTE — P.HPADDEND
H&P Addendum


H&P Addendum Date: 02/19/21





I personally contacted the patient's wife for discussion with the patient to 

review his general care.  Plans for surgical intervention for complicated intra-

abdominal abscess and fistula was described.  Temporary colostomy was reviewed. 

Open drainage of carotid abscess also described.  Care team to include 

gastroenterologist, infectious disease specialist, hospitalist also reviewed.  

Anticipated timeframe in the hospital between 7-10 days described with 

coordination of care for home health care reviewed.  All questions were 

addressed.

## 2021-02-19 NOTE — ECHOF
Referral Reason:Hypertensive heart disease



MEASUREMENTS

--------

HEIGHT: 188.0 cm

WEIGHT: 75.7 kg

BP: 98/59

RVIDd:   3.4 cm     (< 3.3)

IVSd:   1.2 cm     (0.6 - 1.1)

LVIDd:   4.5 cm     (3.9 - 5.3)

LVPWd:   1.2 cm     (0.6 - 1.1)

IVSs:   1.5 cm

LVIDs:   3.0 cm

LVPWs:   1.8 cm

LA Diam:   3.5 cm     (2.7 - 3.8)

LAESV Index (A-L):   24.23 ml/m

Ao Diam:   3.4 cm     (2.0 - 3.7)

AV Cusp:   2.4 cm     (1.5 - 2.6)

MV EXCURSION:   18.162 mm     (> 18.000)

MV EF SLOPE:   119 mm/s     (70 - 150)

EPSS:   0.5 cm

MV E Pillo:   0.68 m/s

MV DecT:   219 ms

MV A Pillo:   0.60 m/s

MV E/A Ratio:   1.13 

RAP:   5.00 mmHg

RVSP:   27.82 mmHg







FINDINGS

--------

Sinus rhythm.

This was a technically good study.

The left ventricular size is normal.   There is borderline concentric left ventricular hypertrophy.  
 Overall left ventricular systolic function is normal with, an EF between 60 - 65 %.

The right ventricle is mildly enlarged.

Normal LA  size by volume 22+/-6 ml/m2.

The right atrium is normal in size.

Interatrial and interventricular septum intact.

The aortic valve is trileaflet and appears structurally normal.

There is trace to mild mitral regurgitation.

Mild tricuspid regurgitation present.   Right ventricular systolic pressure is normal at < 35 mmHg.

Trace/mild (physiologic)  pulmonic regurgitation.

The aortic root size is normal.

Normal inferior vena cava with normal inspiratory collapse consistent with estimated right atrial pre
ssure of  5 mmHg.

There is no pericardial effusion.



CONCLUSIONS

--------

1. The left ventricular size is normal.

2. There is borderline concentric left ventricular hypertrophy.

3. Overall left ventricular systolic function is normal with, an EF between 60 - 65 %.

4. The right ventricle is mildly enlarged.

5. Normal LA size by volume 22+/-6 ml/m2.

6. There is trace to mild mitral regurgitation.

7. Mild tricuspid regurgitation present.

8. Trace/mild (physiologic)  pulmonic regurgitation.

9. There is no pericardial effusion.





SONOGRAPHER: SUSAN Bear

## 2021-02-19 NOTE — P.PN
Subjective


Progress Note Date: 02/19/21














CHIEF COMPLAINT: Intra-abdominal abscess with fistula





HISTORY OF PRESENT ILLNESS: The patient is a 60-year-old male presented from 

[facility] with local quadrant abdominal pain initially 

suspected to be left inguinal hernia.  He had a CT of the abdomen and pelvis 

demonstrating findings of left lower quadrant colocutaneous fistula.  Patient 

has history of Crohn's untreated for over 20+ years.  He reports obstructive 

symptoms including constipation and inability to pass flatus.  He presented with

leukocytosis including fevers consistent with sepsis.  Abdominal pain is mild.  

Patient's last colonoscopy was over 22+ years ago.  Patient reports no new 

fevers today.  Overall, patient has not had structure medical care over 20+ 

years.





REVIEW OF ORGAN SYSTEMS: No fevers or chills in 24 hours, no chest pain or 

shortness of breath, no productive sputum





PHYSICAL EXAM:


VITALS: Reviewed


CONSTITUTIONAL:  Well developed and in no acute distress. 


EYES:  Conjuctivae without sclera icterus. Extraocular movements grossly intact.




HEAD, EARS, NOSE, THROAT: Moist buccal mucosa. Head is atraumatic, 

normocephalic. Hears conversational speech. No nasal drainage. 


NECK: No JV distention. No thyroidomegaly.


RESPIRATORY:  Non-labored respirations and equal bilateral excursions. No gross 

wheezes. 


CARDIOVASCULAR:  Regular rate and rhythm.  Extremities without edema. Palpable 

2+ radial pulses.


ABDOMEN:  Soft. No peritonitis.  Erythematous left lower abdomen over 10 x 5 cm 


MUSCULOSKELETAL: Nail and fingers with good capillary refill.  Has clubbing 

along the fingers. 


SKIN:  Warm and well perfused with good skin turgor.


NEUROLOGIC: Cranial nerves II through XII grossly intact. No focal or 

lateralizing signs. 


PSYCH:  Appropriate affect.  Alert and oriented to person, place and time. 

Displays appropriate insight.





CLINCAL LABS: CRP and ESR elevated.  Iron panel low consistent with iron 

deficiency anemia





ASSESSMENT: 


1.  Sepsis with intra-abdominal colocutaneous fistula


2.  Crohn's, complicated


3.  Lack of medical care


4.  Family history of cardiovascular disease including a stroke


5.  Iron deficiency anemia





PLAN: 


1.  An echo has been ordered due to high risk operation and family history of 

cardiovascular disease including strokes and heart attacks.


2.  Cardiology consultation for risk assessment obtained


3.  Infectious disease consultation for complicated infection


4.  Medicine consultation for general medical management


5.  Benefits and risk of robotic with possible open colostomy creation and open 

drainage of right lower abdominal wall abscess described.


6.  Home healthcare and discharge planning management needed for wound care 

management as well as potential intravenous antibiotic management


7.  Iron infusions  iron deficiency anemia


8.  Ostomy care nurse consultation for new ostomy


9.  Social work consultation for global needs and indigent care





Objective





- Vital Signs


Vital signs: 


                                   Vital Signs











Temp  97.7 F   02/19/21 07:00


 


Pulse  65   02/19/21 08:00


 


Resp  16   02/19/21 07:00


 


BP  96/56   02/19/21 07:00


 


Pulse Ox  97   02/19/21 07:00








                                 Intake & Output











 02/18/21 02/19/21 02/19/21





 18:59 06:59 18:59


 


Intake Total 600  


 


Balance 600  


 


Intake:   


 


  Oral 600  


 


Other:   


 


  Voiding Method Toilet Toilet Toilet


 


  # Voids  1 














- Labs


CBC & Chem 7: 


                                 02/18/21 06:03





                                 02/18/21 06:03


Labs: 


                  Abnormal Lab Results - Last 24 Hours (Table)











  02/18/21 02/18/21 Range/Units





  10:48 10:51 


 


ESR   91 H  (0-15)  mm/hr


 


Iron  11 L   ()  ug/dL


 


% Saturation  4.68 L   (15.00-50.00)   


 


C-Reactive Protein  227.5 H   (<10.0)  mg/L








                      Microbiology - Last 24 Hours (Table)











 02/17/21 21:20 Blood Culture - Preliminary





 Blood    No Growth after 24 hours














Assessment and Plan


(1) Left lower quadrant pain


Current Visit: Yes   Status: Acute   Code(s): R10.32 - LEFT LOWER QUADRANT PAIN 

 SNOMED Code(s): 408959343


   





(2) Crohn's disease


Current Visit: Yes   Status: Acute   Code(s): K50.90 - CROHN'S DISEASE, 

UNSPECIFIED, WITHOUT COMPLICATIONS   SNOMED Code(s): 33766115


   





(3) Leukocytosis


Current Visit: Yes   Status: Acute   Code(s): D72.829 - ELEVATED WHITE BLOOD 

CELL COUNT, UNSPECIFIED   SNOMED Code(s): 442334149


   





(4) Anemia, iron deficiency


Current Visit: Yes   Status: Acute   Code(s): D50.9 - IRON DEFICIENCY ANEMIA, 

UNSPECIFIED   SNOMED Code(s): 95782096


   





(5) Sepsis


Current Visit: Yes   Status: Acute   Code(s): A41.9 - SEPSIS, UNSPECIFIED 

ORGANISM   SNOMED Code(s): 95176213


   





(6) Colocutaneous fistula


Current Visit: Yes   Status: Acute   Code(s): K63.2 - FISTULA OF INTESTINE   

SNOMED Code(s): 470637826

## 2021-02-19 NOTE — P.OP
Date of Procedure: 02/19/21


Description of Procedure: 











SURGEON:  MICHAEL BARTON MD


PREOPERATIVE DIAGNOSES:  


1.  Colocutaneous fistula, left lower quadrant with sepsis


2.  Complicated Crohn's disease with colocutaneous fistula


3.  Abdominal wall abscess, left lower quadrant


4.  Lack of general medical care for Crohns disease over 20+ years





POSTOPERATIVE DIAGNOSES:


1.  Colocutaneous fistula, left lower quadrant with sepsis


2.  Complicated Crohn's disease with colocutaneous fistula


3.  Complicated subfascial abdominal wall abscess, left lower quadrant


4.  Large and small bowel obstruction due to sigmoid colon stricture 2


5.  Abdominal ascites





Anesthesia: GETA, local


Estimated Blood Loss (ml): 100


Pathology: Sigmoid colon, aerobic and anaerobic cultures abdominal wall


Condition: stable


Disposition: floor


COMPLICATIONS: None. 





OPERATION: 


1.  Diagnostic laparoscopy converted to open exploratory laparotomy


2.  Sigmoid colectomy with takedown of colocutaneous fistula


3.  Descending colostomy creation with defunctionalized rectum, Trujillo's 

procedure


4.  Open drainage of left lower quadrant subfascial complex abdominal wall 

abscess, 18 x 8 cm


5.  Mechanical debridement with pulse jet water lavage 3 L normal saline


6.  Application of wound VAC, Prevena universal midline and left lower quadrant





FINDINGS: 


1.  Diagnostic laparoscopy demonstrating small and large bowel dilation 

consistent with bowel obstruction requiring open technique


2.  Abdominal ascites in the pelvis


3.  [place] like inflammation involving mid sigmoid colon and distal sigmoid 

colon with stricture


4.  Colocutaneous fistula from sigmoid colon to abdominal wall, left lower 

quadrant divided


5.  Deep subfascial including subcutaneous complex abscess left lower quadrant 

completely drained without features of necrotizing fasciitis, 18 x 8 cm


6.  Immediately erythema moderately result following case


7.  Drainage of 10 mL non-malodorous purulent abscess, subfascial left lower 

quadrant abdominal wall








INDICATIONS: The patient is a 60-year-old gentleman who presented to the 

hospital with two-week left lower quadrant abdominal pain including groin with 

initial presenting diagnosis of incarcerated left inguinal hernia.  With history

of Crohn's disease and presentation of sepsis, CT of the abdomen and pelvis was 

obtained demonstrating complicated colocutaneous fistula involving the sigmoid 

colon.  Surgical intervention initially with robotic technique described the 

possibility of open technique for sigmoid colectomy and drainage of abdominal 

wall abscess were reviewed. All questions were answered and risks were reviewed 

with the patient Informed consent was obtained. 





DESCRIPTION: Patient was brought to the operating room. He is on scheduled IV 

antibiotics. 





The patient was placed in supine position whereby general induction was 

performed. Abdomen had been prepped and draped in the standard sterile fashion 

with placement of orogastric tube and Vigil catheter was placed. Ioban


draping was also placed to minimize any contamination to the skin. 





Using a #11 blade, a 5 mm laparoscopic trocar entry was performed along the left

upper quadrant.  The abdomen was insufflated to 15 mmHg pressure.  Diagnostic 

laparoscopy demonstrated no injury to bowel, viscera or mesentery. The small 

bowel was moderately distended. Despite steep Trendelenberg position, the 

pathology of the sigmoid colon could not be adequately visualized hence open 

procedure was proposed.





Next, using #10 blade, the abdomen was entered along the midline incision from 

the xiphoid down to the pubis. The peritoneum was entered. Next, Buckwalter 

retractor was placed. 





Inspection of the abdomen demonstrated no evidence of peritoneal studding or 

lesions along the surface of the liver.  The sigmoid colon was extremely thick 

including along its mesentery at the left lower quadrant consistent with 

colocutaneous fistula.  Palpation was consistent with concrete.  Additionally, 2

areas of stricture was confirmed along the sigmoid colon upon palpation.  

Proposed resection above and below stricture points was made.





The descending colon was also mobilized along the white line of Toldt. The 

sigmoid colon was mobilized along the medial and lateral attachments with care 

to avoid injury to the ureters along the usual anatomical landmarks. 





Next, attention was brought to the colocutaneous fistula to allow complete 

resection of the sigmoid colon.  Along the abdominal wall, digital palpation was

performed.  Initially a stapler was proposed but the tissue was too thick to 

separate.  Using electro-Bovie cautery, the colocutaneous fistula was divided.  

The fistulous tract was less than 8 mm in size.  No active purulent drainage had

emanated from the abdominal wall upon resection.  Next, distal to the fistula, 

window was created along the mesentery.  Covidien 60 mm black staple loads were 

fired to divide the distal sigmoid colon.  The rectal stump was tacked using 2-0

Prolene.  The sigmoid mesentery was extremely dense with his history of Crohn's 

disease.  The sigmoid colon was mobilized to the descending colon along the 

white line of Toldt. Minimal contamination occurred throughout the case.  

Proximally, the sigmoid colon was similarly divided using 60 mm black staple 

loads.





The descending colon was prepared for maturation of a colostomy. Attention was 

brought to delivering and creating the descending colostomy. A point along the 

abdominal wall and rectus muscle was selected for his colostomy and a quarter 

size skin resection was made using Bovie cautery. The fat of the skin was 

mobilized using a small rich. The rectus muscle was identified and scored with a

cruciate scoring of electro- Bovie cautery. Next, using a muscle-splitting 

technique with a


hemostat, the peritoneum was entered. The peritoneum was widened such that 2 

fingerbreadths could easily pass for delivering and evaginating the descending 

portion of the colon through the skin. 





The midline incision was closed using double-stranded 0 PDS. The midline 

incision was covered and attention was brought to maturation of the colostomy. 

The staple edge was divided and removed. Next quadrant sutures at 12 o'clock, 3 

o'clock, 6 o'clock, and 9 o'clock position was made using serosa, mucosal and  

dermal bites using 2-0 Vicryl. Running 3-0 Vicryl was placed to completely 

mature the ostomy. Hemostasis was checked. A Coloplast was then placed. 





A universal Prevena wound VAC was applied along the midline using Tegaderm along

the skin followed by sponge followed by Tegaderm over the sponge.





Attention was brought to the left lower quadrant where the erythematous area 18 

x 8 cm was marked with a marking pen.


Along, the left groin, a 4 cm transverse incision along the skin tension lines 

was made.  The wound was explored digitally where 10 mL of non-malodorous 

purulent drainage was obtained.  The wound was explored where the abscesses 

found deep to the fascia and the muscle.  The pocket was copiously irrigated 

using 3 L normal saline pulse water jet lavaged until clear.





The incision was reapproximated using skin staples with Aquasol AG rope place in

the body of the pocket.  





Additionally, wound VAC was placed with a bridge sponge to allow for complete 

suction.





At the end of the procedure, needle, sponge, and instrument count had been 

verified correct by surgical technician. 





The patient's wife was updated on level of care.  All questions were addressed 

and his wife was pleased with the level of care.

## 2021-02-19 NOTE — P.CONS
History of Present Illness





- Reason for Consult


Consult date: 02/19/21


surgical clearance 


Requesting physician: Lexi Morrissey





- Chief Complaint


abdominal pain





- History of Present Illness


Patient is a 59 yo M with a hx of crohns disease intiially diagnosed 40 year ago

currently treating with homeopathic options who initially presented to his PCP 

in [place] with left groin pain, they were concerned for hernia that was 

not reducible and sent him to the hospital. He was admitted to surgery and 

underwent CT abdomen and pelvis which showed abscess and fistula tract. Plan is 

for colectomy with drainage of abscess and colostomy formation. 





Patient seen and examined at bedside. Left groin pain for 1 week prior to coming

to the hospital with increasing protrusion in the left groin area. He had 

similar findings in Janurary and put himself on clear liquid diet which helped 

and then it came back worse. No nausea, vomiting, diarrhea. He works on a farm 

and in welding. He continually lifts over 50 pounds and is very physically acti

ve. Walks over 3 city blocks without difficulty. 





Crohns: On azacol up into 5 years ago and then was trying to transition due to 

drug not being available. Nothing worked and then tried homeopathic approach. 

Obstruction 22 years ago and cleaned out in [place] no resection at that time. 

Last Colonoscopy 22 years agon.  




















Review of Systems


Pertinent positives and negatives as discussed in HPI, a complete review of 

systems was performed and all other systems are negative.








Past Medical History


Additional Past Medical History / Comment(s): collitis, chrons


History of Any Multi-Drug Resistant Organisms: None Reported


Additional Past Surgical History / Comment(s): Colonoscopy last one 22 years ago


Past Psychological History: No Psychological Hx Reported


Smoking Status: Never smoker


Past Alcohol Use History: Rare


Past Drug Use History: None Reported





- Past Family History


  ** family


Additional Family Medical History / Comment(s): Grandmother MI in late 60s, 

Grandfather CVA, Mother CVA X 2 age 90





Medications and Allergies


                                Home Medications











 Medication  Instructions  Recorded  Confirmed  Type


 


No Known Home Medications  02/17/21 02/17/21 History








                                    Allergies











Allergy/AdvReac Type Severity Reaction Status Date / Time


 


Milk Containing Products Allergy  Unknown Verified 02/17/21 19:48





[Dairy]     














Physical Exam


Osteopathic Statement: *.  No significant issues noted on an osteopathic st

ructural exam other than those noted in the History and Physical/Consult.


Vitals: 


                                   Vital Signs











  Temp Pulse Resp BP BP Pulse Ox


 


 02/19/21 07:00  97.7 F  65  16   96/56  97


 


 02/19/21 03:45  97.9 F  70   111/61   97


 


 02/19/21 02:00   70    


 


 02/18/21 19:00  98.6 F  57 L   106/54   100


 


 02/18/21 14:10  97.8 F  61  16  108/63   99








                                Intake and Output











 02/18/21 02/19/21 02/19/21





 22:59 06:59 14:59


 


Other:   


 


  Voiding Method Toilet Toilet 


 


  # Voids 1 1 











General: non toxic, no distress, appears at stated age


Derm:  warm, dry


Head: atraumatic, normocephalic, symmetric


Eyes: EOMI, no lid lag, anicteric sclera, pupils equal round reactive to light


ENT: Nose and ears atraumatic, no thrush,  no pharyngeal erythema


Neck: No thyromegaly, no cervical lymphadenopathy, trachea midline, supple


Mouth: no lip lesion, mucus membranes moist


Cardiovascular: S1S2 reg, no murmur, positive posterior tibial pulse bilateral, 

no edema, capillary refill less than 2 seconds


Lungs: clear to ascultation bilateral, no ronchi, no rales, no wheeze, no 

accessory muscle use


Abdominal: soft,  protrusion left groin no fluctuate with surrounding erythema 

of the skin and pain to palpation of the area, no guarding, no appreciable 

organomegaly, normal bowel sounds


Ext: no gross muscle atrophy,  muscle strength muscle strength 5 out of 5 in all

 4 extremities,  no contractures


Neuro:  CN II-XI grossly intact, light touch intact all 4 extremities, finger to

 nose within normal limits,


Psych: Alert, oriented, appropriate affect








Results


CBC & Chem 7: 


                                 02/18/21 06:03





                                 02/18/21 06:03


Labs: 


                  Abnormal Lab Results - Last 24 Hours (Table)











  02/18/21 02/18/21 02/18/21 Range/Units





  06:03 06:03 10:48 


 


WBC  13.40 H    (4.50-10.00)  X 10*3/uL


 


RBC  3.77 L    (4.40-5.60)  X 10*6/uL


 


Hgb  10.2 L    (13.0-17.0)  g/dL


 


Hct  31.4 L    (39.6-50.0)  %


 


MPV  9.3 L    (9.5-12.2)  fL


 


Immature Gran #  0.06 H    (0.00-0.04)  X 10*3/uL


 


Neutrophils #  10.88 H    (1.80-7.70)  X 10*3/uL


 


Monocytes #  1.16 H    (0.20-1.00)  X 10*3/uL


 


ESR     (0-15)  mm/hr


 


Calcium   8.0 L   (8.7-10.3)  mg/dL


 


Iron    11 L  ()  ug/dL


 


% Saturation    4.68 L  (15.00-50.00)   


 


C-Reactive Protein    227.5 H  (<10.0)  mg/L














  02/18/21 Range/Units





  10:51 


 


WBC   (4.50-10.00)  X 10*3/uL


 


RBC   (4.40-5.60)  X 10*6/uL


 


Hgb   (13.0-17.0)  g/dL


 


Hct   (39.6-50.0)  %


 


MPV   (9.5-12.2)  fL


 


Immature Gran #   (0.00-0.04)  X 10*3/uL


 


Neutrophils #   (1.80-7.70)  X 10*3/uL


 


Monocytes #   (0.20-1.00)  X 10*3/uL


 


ESR  91 H  (0-15)  mm/hr


 


Calcium   (8.7-10.3)  mg/dL


 


Iron   ()  ug/dL


 


% Saturation   (15.00-50.00)   


 


C-Reactive Protein   (<10.0)  mg/L








                      Microbiology - Last 24 Hours (Table)











 02/17/21 21:20 Blood Culture - Preliminary





 Blood    No Growth after 24 hours











CT scan - abdomen: report reviewed


CT scan - pelvis: report reviewed





Assessment and Plan


Assessment: 


Sigmoid colitis with fistulas tract and large abscess with sepsis


- plan is for surgery today with drainage of abscess, colon resection and 

colostomy 


- Pre-op risk assessment NSQIP risk calculator completed and patient is at below

 average risk for colostomy with colectomy, will place score on physical chart


        - no additional testing need, patient medically optimized to proceed 

with this urgent surgery 


- continue with zosyn and flagyl 


- NPO


- Pain control 





Anemia


- likely iron deficiency 


- with ferritin increased due to inflammatory process


- patient on IV iron





Thank you for allowing us to participate in the care of this pleasant patient.  

Do not hesitate to contact us with questions.  Someone can be reached from the [facility] hospitalist group all hours of the day at [phone] or via 

perfect serve.

## 2021-02-19 NOTE — P.PN
Subjective


Progress Note Date: 02/19/21


Principal diagnosis: 





Abdominal abscess, Crohn's disease





Patient is seen and examined lying in bed.  States he had some increase 

abdominal pain through the night, however this morning he had a large bowel 

movement which he states was loose and then performed.  He denies any blood in 

his stool or rectal bleeding.  He denies any fevers or chills through the night.

 He remains on IV antibiotics.  He is scheduled for a colostomy creation and 

abscess drainage with Dr. Morrissey this afternoon.





Objective





- Vital Signs


Vital signs: 


                                   Vital Signs











Temp  97.7 F   02/19/21 07:00


 


Pulse  65   02/19/21 08:00


 


Resp  16   02/19/21 07:00


 


BP  96/56   02/19/21 07:00


 


Pulse Ox  97   02/19/21 07:00








                                 Intake & Output











 02/18/21 02/19/21 02/19/21





 18:59 06:59 18:59


 


Intake Total 600  


 


Balance 600  


 


Intake:   


 


  Oral 600  


 


Other:   


 


  Voiding Method Toilet Toilet Toilet


 


  # Voids  1 














- Exam





General appearance: The patient is alert, oriented, appears in no acute 

distress.


HET: Head is normocephalic and atraumatic.  Conjunctiva pink.  Sclera anicteric.


Neck: Supple without lymphadenopathy.  


Abdomen: Soft,  left lower quadrant and groin tender with swelling and redness, 

nondistended with  bowel sounds.  No guarding or rigidity.


Extremities: Normal skin color and turgor.  No pedal edema


Skin: No rashes, no jaundice


Neurological: No focal deficits.  Alert and oriented 3.





- Labs


CBC & Chem 7: 


                                 02/18/21 06:03





                                 02/18/21 06:03


Labs: 


                  Abnormal Lab Results - Last 24 Hours (Table)











  02/18/21 02/18/21 02/18/21 Range/Units





  06:03 10:48 10:51 


 


ESR    91 H  (0-15)  mm/hr


 


Calcium  8.0 L    (8.7-10.3)  mg/dL


 


Iron   11 L   ()  ug/dL


 


% Saturation   4.68 L   (15.00-50.00)   


 


C-Reactive Protein   227.5 H   (<10.0)  mg/L








                      Microbiology - Last 24 Hours (Table)











 02/17/21 21:20 Blood Culture - Preliminary





 Blood    No Growth after 24 hours














Assessment and Plan


(1) Abdominal wall abscess


Narrative/Plan: 


CT of the abdomen and pelvis showed abdominal wall abscess in the left groin 

area measuring 7.8 x 3.8 cm in size with adjacent sigmoid wall thickening 

suspicious for sigmoid diverticulitis with fistula tract.  Possibility of 

neoplasm cannot be excluded.  Patient's last colonoscopy was approximately 22 

years ago.





Gen. surgery on consult.  Patient scheduled for colostomy formation, abscess 

drainage with Dr. Morrissey in this afternoon.


Current Visit: Yes   Status: Acute   Code(s): L02.211 - CUTANEOUS ABSCESS OF 

ABDOMINAL WALL   SNOMED Code(s): 25290545


   





(2) Crohn's disease


Narrative/Plan: 


This is a 60-year-old gentleman who has a history of Crohn's disease, Crohn's 

colitis, and has been in clinical remission for last 22 years.  He was diagnosed

40 years ago.  States his last colonoscopy was 22 years ago.  Patient presently 

on no maintenance medications.


Current Visit: Yes   Status: Acute   Code(s): K50.90 - CROHN'S DISEASE, 

UNSPECIFIED, WITHOUT COMPLICATIONS   SNOMED Code(s): 14600123


   





(3) Left lower quadrant pain


Current Visit: Yes   Status: Acute   Code(s): R10.32 - LEFT LOWER QUADRANT PAIN 

 SNOMED Code(s): 777070738


   





(4) Leukocytosis


Current Visit: Yes   Status: Acute   Code(s): D72.829 - ELEVATED WHITE BLOOD 

CELL COUNT, UNSPECIFIED   SNOMED Code(s): 479133328


   


Plan: 





1.  Supportive care


2.  Continue with broad-spectrum antibiotics


3.  Collaboration with Dr. Morrissey, discussion was had that recommendation was

for surgical intervention with drainage of abscess as well as sigmoid colon 

resection with possible colostomy.


4.  No plans on doing colonoscopy intervention at the present time.


5.  We'll continue to follow with you closely


Thank you for this consultation





Dr. Bartlett


I agree with the dictator's note, documented as a scribe by Ruchi MERRITT.

## 2021-02-20 LAB
ANION GAP SERPL CALC-SCNC: 8.8 MMOL/L (ref 4–12)
BASOPHILS # BLD AUTO: 0.04 X 10*3/UL (ref 0–0.1)
BASOPHILS NFR BLD AUTO: 0.4 %
BUN SERPL-SCNC: 13 MG/DL (ref 9–27)
BUN/CREAT SERPL: 16.25 RATIO (ref 12–20)
CALCIUM SPEC-MCNC: 7.7 MG/DL (ref 8.7–10.3)
CHLORIDE SERPL-SCNC: 109 MMOL/L (ref 96–109)
CO2 SERPL-SCNC: 24.2 MMOL/L (ref 21.6–31.8)
EOSINOPHIL # BLD AUTO: 0.02 X 10*3/UL (ref 0.04–0.35)
EOSINOPHIL NFR BLD AUTO: 0.2 %
ERYTHROCYTE [DISTWIDTH] IN BLOOD BY AUTOMATED COUNT: 3.39 X 10*6/UL (ref 4.4–5.6)
ERYTHROCYTE [DISTWIDTH] IN BLOOD: 13.9 % (ref 11.5–14.5)
GLUCOSE SERPL-MCNC: 94 MG/DL (ref 70–110)
HCT VFR BLD AUTO: 28.8 % (ref 39.6–50)
HGB BLD-MCNC: 9 G/DL (ref 13–17)
LYMPHOCYTES # SPEC AUTO: 0.76 X 10*3/UL (ref 0.9–5)
LYMPHOCYTES NFR SPEC AUTO: 6.7 %
MAGNESIUM SPEC-SCNC: 1.6 MG/DL (ref 1.5–2.4)
MCH RBC QN AUTO: 26.5 PG (ref 27–32)
MCHC RBC AUTO-ENTMCNC: 31.3 G/DL (ref 32–37)
MCV RBC AUTO: 85 FL (ref 80–97)
MONOCYTES # BLD AUTO: 0.61 X 10*3/UL (ref 0.2–1)
MONOCYTES NFR BLD AUTO: 5.4 %
NEUTROPHILS # BLD AUTO: 9.9 X 10*3/UL (ref 1.8–7.7)
NEUTROPHILS NFR BLD AUTO: 86.8 %
PLATELET # BLD AUTO: 383 X 10*3/UL (ref 140–440)
POTASSIUM SERPL-SCNC: 4.4 MMOL/L (ref 3.5–5.5)
SODIUM SERPL-SCNC: 142 MMOL/L (ref 135–145)
WBC # BLD AUTO: 11.39 X 10*3/UL (ref 4.5–10)

## 2021-02-20 RX ADMIN — CEFAZOLIN SCH MLS/HR: 330 INJECTION, POWDER, FOR SOLUTION INTRAMUSCULAR; INTRAVENOUS at 16:44

## 2021-02-20 RX ADMIN — METRONIDAZOLE SCH MLS/HR: 500 INJECTION, SOLUTION INTRAVENOUS at 12:14

## 2021-02-20 RX ADMIN — PIPERACILLIN AND TAZOBACTAM SCH MLS/HR: 3; .375 INJECTION, POWDER, FOR SOLUTION INTRAVENOUS at 21:38

## 2021-02-20 RX ADMIN — ENOXAPARIN SODIUM SCH MG: 30 INJECTION SUBCUTANEOUS at 07:41

## 2021-02-20 RX ADMIN — MAGNESIUM SULFATE IN DEXTROSE SCH MLS/HR: 10 INJECTION, SOLUTION INTRAVENOUS at 12:14

## 2021-02-20 RX ADMIN — MAGNESIUM SULFATE IN DEXTROSE SCH MLS/HR: 10 INJECTION, SOLUTION INTRAVENOUS at 12:15

## 2021-02-20 RX ADMIN — CEFAZOLIN SCH MLS/HR: 330 INJECTION, POWDER, FOR SOLUTION INTRAMUSCULAR; INTRAVENOUS at 05:04

## 2021-02-20 RX ADMIN — GABAPENTIN SCH MG: 300 CAPSULE ORAL at 07:41

## 2021-02-20 RX ADMIN — TAMSULOSIN HYDROCHLORIDE SCH MG: 0.4 CAPSULE ORAL at 07:41

## 2021-02-20 RX ADMIN — CEFAZOLIN SCH: 330 INJECTION, POWDER, FOR SOLUTION INTRAMUSCULAR; INTRAVENOUS at 14:28

## 2021-02-20 RX ADMIN — PIPERACILLIN AND TAZOBACTAM SCH MLS/HR: 3; .375 INJECTION, POWDER, FOR SOLUTION INTRAVENOUS at 05:03

## 2021-02-20 RX ADMIN — ACETAMINOPHEN SCH MG: 500 TABLET ORAL at 12:14

## 2021-02-20 RX ADMIN — HYDROMORPHONE HYDROCHLORIDE PRN MG: 1 INJECTION, SOLUTION INTRAMUSCULAR; INTRAVENOUS; SUBCUTANEOUS at 00:07

## 2021-02-20 RX ADMIN — SODIUM FERRIC GLUCONATE COMPLEX SCH MLS/HR: 12.5 INJECTION INTRAVENOUS at 19:55

## 2021-02-20 RX ADMIN — METRONIDAZOLE SCH MLS/HR: 500 INJECTION, SOLUTION INTRAVENOUS at 05:03

## 2021-02-20 RX ADMIN — ACETAMINOPHEN SCH MG: 500 TABLET ORAL at 23:40

## 2021-02-20 RX ADMIN — PIPERACILLIN AND TAZOBACTAM SCH MLS/HR: 3; .375 INJECTION, POWDER, FOR SOLUTION INTRAVENOUS at 12:15

## 2021-02-20 RX ADMIN — ACETAMINOPHEN SCH MG: 500 TABLET ORAL at 16:45

## 2021-02-20 RX ADMIN — GABAPENTIN SCH MG: 300 CAPSULE ORAL at 21:38

## 2021-02-20 RX ADMIN — METRONIDAZOLE SCH MLS/HR: 500 INJECTION, SOLUTION INTRAVENOUS at 23:41

## 2021-02-20 RX ADMIN — PANTOPRAZOLE SODIUM SCH MG: 40 INJECTION, POWDER, FOR SOLUTION INTRAVENOUS at 07:40

## 2021-02-20 RX ADMIN — METRONIDAZOLE SCH MLS/HR: 500 INJECTION, SOLUTION INTRAVENOUS at 16:44

## 2021-02-20 RX ADMIN — ACETAMINOPHEN SCH MG: 500 TABLET ORAL at 05:04

## 2021-02-20 RX ADMIN — GABAPENTIN SCH MG: 300 CAPSULE ORAL at 16:44

## 2021-02-20 RX ADMIN — ONDANSETRON PRN MG: 2 INJECTION INTRAMUSCULAR; INTRAVENOUS at 07:40

## 2021-02-20 NOTE — P.PN
Subjective


Progress Note Date: 02/20/21


Principal diagnosis: 





Crohn's disease, colocutaneous fistula, abscess





Patient is seen lying in bed status post exploratory laparotomy with sigmoid 

colectomy and descending ostomy formation.  No acute complaints.





Objective





- Vital Signs


Vital signs: 


                                   Vital Signs











Temp  98.2 F   02/20/21 07:00


 


Pulse  68   02/20/21 07:00


 


Resp  19   02/20/21 07:00


 


BP  99/59   02/20/21 07:00


 


Pulse Ox  90 L  02/20/21 07:00








                                 Intake & Output











 02/19/21 02/20/21 02/20/21





 18:59 06:59 18:59


 


Intake Total 2600  


 


Output Total 950 1725 


 


Balance 1650 -1725 


 


Weight 75.75 kg 75.75 kg 


 


Intake:   


 


  IV 2600  


 


Output:   


 


  Urine 850 1725 


 


  Estimated Blood Loss 100  


 


Other:   


 


  Voiding Method Toilet Indwelling Catheter Indwelling Catheter














- Exam





On physical examination, patient appears comfortable in no apparent distress. 


HEAD: Normocephalic, atraumatic. 


EYES: No scleral icterus. No conjunctival injection. 


MOUTH: No lesions, tongue midline. 


NECK: Trachea midline, no gross abnormalities. 


ABDOMEN: Soft, colostomy intact with wound VAC. Bowel sounds are positive. No 

organomegaly.  No guarding or rigidity.


EXTREMITIES: No pedal edema. 


SKIN: No rashes, no jaundice. 


NEUROLOGIC: Alert and oriented x3.  No focal deficits. 





- Labs


CBC & Chem 7: 


                                 02/20/21 06:36





                                 02/20/21 06:36


Labs: 


                  Abnormal Lab Results - Last 24 Hours (Table)











  02/19/21 02/19/21 02/20/21 Range/Units





  08:33 08:33 06:36 


 


WBC  13.52 H    (4.50-10.00)  X 10*3/uL


 


RBC  3.79 L    (4.40-5.60)  X 10*6/uL


 


Hgb  10.0 L    (13.0-17.0)  g/dL


 


Hct  32.7 L    (39.6-50.0)  %


 


MCH  26.4 L    (27.0-32.0)  pg


 


MCHC  30.6 L    (32.0-37.0)  g/dL


 


Immature Gran #  0.07 H    (0.00-0.04)  X 10*3/uL


 


Neutrophils #  12.28 H    (1.80-7.70)  X 10*3/uL


 


Lymphocytes #  0.50 L    (0.90-5.00)  X 10*3/uL


 


Eosinophils #  0.02 L    (0.04-0.35)  X 10*3/uL


 


Chloride   110 H   ()  mmol/L


 


Carbon Dioxide   19.8 L   (21.6-31.8)  mmol/L


 


Calcium   7.8 L  7.7 L  (8.7-10.3)  mg/dL














  02/20/21 Range/Units





  06:36 


 


WBC  11.39 H  (4.50-10.00)  X 10*3/uL


 


RBC  3.39 L  (4.40-5.60)  X 10*6/uL


 


Hgb  9.0 L  (13.0-17.0)  g/dL


 


Hct  28.8 L  (39.6-50.0)  %


 


MCH  26.5 L  (27.0-32.0)  pg


 


MCHC  31.3 L  (32.0-37.0)  g/dL


 


Immature Gran #  0.06 H  (0.00-0.04)  X 10*3/uL


 


Neutrophils #  9.90 H  (1.80-7.70)  X 10*3/uL


 


Lymphocytes #  0.76 L  (0.90-5.00)  X 10*3/uL


 


Eosinophils #  0.02 L  (0.04-0.35)  X 10*3/uL


 


Chloride   ()  mmol/L


 


Carbon Dioxide   (21.6-31.8)  mmol/L


 


Calcium   (8.7-10.3)  mg/dL








                      Microbiology - Last 24 Hours (Table)











 02/19/21 15:00 Gram Stain - Preliminary





 Abdomen Wound Culture - Preliminary


 


 02/17/21 21:20 Blood Culture - Preliminary





 Blood    No Growth after 48 hours


 


 02/19/21 15:00 Anaerobic Culture - Preliminary





 Abdomen 














Assessment and Plan


(1) Colocutaneous fistula


Narrative/Plan: 


60-year-old male with known history of Crohn's disease nonmedical treatment 

status post sigmoid colectomy with descending ostomy formation and wound VAC.  

No acute complaints.


Current Visit: Yes   Status: Acute   Code(s): K63.2 - FISTULA OF INTESTINE   

SNOMED Code(s): 755069816


   





(2) Abdominal wall abscess


Current Visit: Yes   Status: Acute   Code(s): L02.211 - CUTANEOUS ABSCESS OF 

ABDOMINAL WALL   SNOMED Code(s): 61364509


   





(3) Crohn's disease


Current Visit: Yes   Status: Acute   Code(s): K50.90 - CROHN'S DISEASE, 

UNSPECIFIED, WITHOUT COMPLICATIONS   SNOMED Code(s): 20983197


   


Plan: 





Supportive care


Diet as per surgical recommendations


Continue respiratory antibiotic therapy


Continue monitor CBC, BMP, LFTs


Continue local care


Patient will need follow-up after discharge with gastroenterology for discussion

about initiation of maintenance therapy given her complicated Crohn's disease


Thank you for allowing us to participate in the care of the patient

## 2021-02-20 NOTE — P.PN
Subjective


Progress Note Date: 02/20/21


Principal diagnosis: 





Colocutaneous fistula





Patient complaining of mild discomfort.  White blood cell count 11.3.  

Tolerating clear liquids.  Ostomy is functioning.





Objective





- Vital Signs


Vital signs: 


                                   Vital Signs











Temp  98.2 F   02/20/21 07:00


 


Pulse  68   02/20/21 07:00


 


Resp  19   02/20/21 07:00


 


BP  99/59   02/20/21 07:00


 


Pulse Ox  90 L  02/20/21 07:00








                                 Intake & Output











 02/19/21 02/20/21 02/20/21





 18:59 06:59 18:59


 


Intake Total 2600  


 


Output Total 950 1725 


 


Balance 1650 -1725 


 


Weight 75.75 kg 75.75 kg 


 


Intake:   


 


  IV 2600  


 


Output:   


 


  Urine 850 1725 


 


  Estimated Blood Loss 100  


 


Other:   


 


  Voiding Method Toilet Indwelling Catheter Indwelling Catheter














- Exam





Abdomen: Soft, nondistended, mild tenderness, dressing clean and dry, ostomy 

pink





- Labs


CBC & Chem 7: 


                                 02/20/21 06:36





                                 02/20/21 06:36


Labs: 


                  Abnormal Lab Results - Last 24 Hours (Table)











  02/19/21 02/19/21 02/20/21 Range/Units





  08:33 08:33 06:36 


 


WBC  13.52 H    (4.50-10.00)  X 10*3/uL


 


RBC  3.79 L    (4.40-5.60)  X 10*6/uL


 


Hgb  10.0 L    (13.0-17.0)  g/dL


 


Hct  32.7 L    (39.6-50.0)  %


 


MCH  26.4 L    (27.0-32.0)  pg


 


MCHC  30.6 L    (32.0-37.0)  g/dL


 


Immature Gran #  0.07 H    (0.00-0.04)  X 10*3/uL


 


Neutrophils #  12.28 H    (1.80-7.70)  X 10*3/uL


 


Lymphocytes #  0.50 L    (0.90-5.00)  X 10*3/uL


 


Eosinophils #  0.02 L    (0.04-0.35)  X 10*3/uL


 


Chloride   110 H   ()  mmol/L


 


Carbon Dioxide   19.8 L   (21.6-31.8)  mmol/L


 


Calcium   7.8 L  7.7 L  (8.7-10.3)  mg/dL














  02/20/21 Range/Units





  06:36 


 


WBC  11.39 H  (4.50-10.00)  X 10*3/uL


 


RBC  3.39 L  (4.40-5.60)  X 10*6/uL


 


Hgb  9.0 L  (13.0-17.0)  g/dL


 


Hct  28.8 L  (39.6-50.0)  %


 


MCH  26.5 L  (27.0-32.0)  pg


 


MCHC  31.3 L  (32.0-37.0)  g/dL


 


Immature Gran #  0.06 H  (0.00-0.04)  X 10*3/uL


 


Neutrophils #  9.90 H  (1.80-7.70)  X 10*3/uL


 


Lymphocytes #  0.76 L  (0.90-5.00)  X 10*3/uL


 


Eosinophils #  0.02 L  (0.04-0.35)  X 10*3/uL


 


Chloride   ()  mmol/L


 


Carbon Dioxide   (21.6-31.8)  mmol/L


 


Calcium   (8.7-10.3)  mg/dL








                      Microbiology - Last 24 Hours (Table)











 02/19/21 15:00 Gram Stain - Preliminary





 Abdomen Wound Culture - Preliminary


 


 02/17/21 21:20 Blood Culture - Preliminary





 Blood    No Growth after 48 hours


 


 02/19/21 15:00 Anaerobic Culture - Preliminary





 Abdomen 














Assessment and Plan


(1) Colocutaneous fistula


Narrative/Plan: 


Patient doing well at this time.  Gradually advance diet.  Increase activity.  

Continue dressing to suction for now.


Current Visit: Yes   Status: Acute   Code(s): K63.2 - FISTULA OF INTESTINE   

SNOMED Code(s): 962196514

## 2021-02-20 NOTE — P.PN
Progress Note - Text


Progress Note Date: 02/20/21


Postoperative day  #1  status post  explaretory laparotomy, sigmoid 

colectomy/epidural catheter placed for postoperative analgesia, patient doing 

well epidural site okay, patient currently on combination of epidural infusion 

solution of Ropivacaine 0.0625% and Dilaudid 20 g per mL the infusion rate at  

  7 ml per hour , patient had no motor deficit epidural site okay , vital signs 

stable ,VAS 2 /10   , 


Assessment and plan= post operative day  # 1  patient doing well   ,pain well 

controlled , there is no anesthesia related complications

## 2021-02-20 NOTE — CONS
CONSULTATION



DATE OF SERVICE:

02/20/2021



REASON FOR CONSULTATION:

Abdominal abscess.



HISTORY OF PRESENT ILLNESS:

The patient is a 60-year-old  male with a past medical history significant for

Crohn's disease, unfortunately, the patient has not received any care for the same. As

per  admitting history and physical, the patient presented to the ER 3 days ago for

evaluation of a left groin pain.  Patient initially evaluated by his primary care

physician with concern for possible non reducible left inguinal hernia repair for which

the patient was sent to [facility] ER.



On arrival to the ER, the patient did have a fever of 100.7 degrees Fahrenheit.  The

patient did have a white count of 12.8.  Repeat is 13.40.  The patient did have CT of

abdomen and pelvis completed which shows sigmoid diverticulitis with fistulous tract

and abscess along the undersurface of the anterior abdominal wall on the left measuring

2.5 cm.  The patient was taken to the OR yesterday afternoon.  The patient was in the

OR, most of the afternoon, hence he could not be seen.  The patient did have

exploratory laparotomy with sigmoid colectomy and takedown of the colocutaneous

fistula, descending colostomy creation and drainage of the abdominal wall abscess.

Cultures have been obtained which are currently pending.  Patient is currently on

Zosyn.  Infectious Disease was consulted for further management of antibiotic therapy.

The patient currently denies having any fever or any chills, the patient denies having

any chest pain.  No shortness of breath.  He did have occasional cough.  No nausea, no

vomiting.  Abdominal pain is currently controlled with pain medication, more of a dull

aching to sharp 4-5 out of 10 and no radiation.



REVIEW OF SYSTEMS:

Positive points have been mentioned in HPI.  Rest of the systems are negative.



PAST MEDICAL HISTORY:

Crohn's colitis.



PAST SURGICAL HISTORY:

Colonoscopy.  No major surgery.



SOCIAL HISTORY:

Remote history of smoking.  No drinking or drug use.



FAMILY HISTORY:

No pertinent findings noticed.



ALLERGIES:

TO MILK CONTAINING PRODUCTIONS.



MEDICATIONS:

Include the patient is currently on: Zosyn 3.375 g q.8 hours.  He is on Protonix,

Zofran, Narcan, Reglan, Dilaudid, Neurontin, Benadryl, Tylenol, Lovenox.



PHYSICAL EXAMINATION:

Blood pressure 103/44, pulse of 74, temperature 98.  He is 93% on room air.

General description: The patient is a middle-aged male lying in bed in no distress.  No

tachypnea or accessory muscles of respiration use.

HEENT:  Shows pallor.  No scleral icterus.  Oral mucous membranes dry.

NECK trachea central.  No thyromegaly.

LUNGS: Unlabored breathing.  Clear to auscultation anteriorly.  No wheeze or crackles.

HEART S1, S2.  Regular rate and rhythm.

ABDOMEN:  Soft, mildly tender.  No guarding.  No rigidity.

EXTREMITIES: No edema of the feet.

SKIN examination: No rash or mass palpable.  NEUROLOGICAL: Patient is awake, alert,

oriented x3. Mood and affect normal.



LABS:

Hemoglobin is 9.4, white count 11.39, admission white count was 12.8.  BUN of 13,

creatinine 0.8.  Electrolytes have been normal.  CRP is 227.  Sedimentation rate was

91.  Abdominal cultures currently pending.  Blood culture so far negative.



DIAGNOSTIC IMPRESSION AND PLAN:

Patient with complicated Crohn's colitis with colocutaneous fistula and abdominal wall

abscess in this patient status post laparotomy with sigmoid colectomy and descending

colon colostomy and drainage of the abscess. We will cover for the enteric gram-

negative with likely pathogen both aerobes and anaerobes.



PLAN:

1. The patient is currently covered with Zosyn 3.375 g q8 hours to continue.

2. Gentle IV fluid.

3. We will follow on his clinical condition and culture to further adjust medication

    if needed.

Thank you for this consultation.  Will follow this patient along with you.





MMODL / IJN: [id number] / Job#: [id number]

## 2021-02-20 NOTE — P.PN
Subjective


Progress Note Date: 02/20/21 (delayed charting seen at 0900 )


Principal diagnosis: 


Abdominal pain





Patient is a 59 yo M with a hx of crohns disease intiially diagnosed 40 year ago

currently treating with homeopathic options who initially presented to his PCP 

in [place] with left groin pain, they were concerned for hernia that was 

not reducible and sent him to the hospital. He was admitted to surgery and 

underwent CT abdomen and pelvis which showed abscess and fistula tract. On 2/19 

he underwent sigmoid colectoy with takedown of colocutaneous fistula, ostomy, 

and abdominal wall abscess drainage. He tolerated the procedure well. 





Patient seen and examined at bedside. No chest pain, shortness of breath.  

Abdominal pain is currently a 2 out of 10.  No upper through his ostomy.  No 

back pain.  Had a headache earlier which is now resolved.


General: ill appearing, mild distress, appears at stated age


Derm: warm, dry


Head: atraumatic, normocephalic, symmetric


Eyes: EOMI, no lid lag, anicteric sclera


Mouth: no lip lesion, mucus membranes moist


Cardiovascular: S1S2 reg, no murmur, positive posterior tibial pulse bilateral, 


Lungs:Decrease bs bilateral, no rhonchi, no rales , no accessory muscle use


Abdominal: soft,  nontender to palpation, no guarding, no appreciable 

organomegaly, wound vac midline and LLQ with erythema in the LLQ 


Ext: no gross muscle atrophy, no edema, no contractures


Neuro:  CN II-XI grossly intact, no focal neuro deficits


Psych: Alert, oriented, appropriate affect 


 





Sigmoid colitis with fistulas tract secondary to crohns disease and large 

subcutaneous abscess with sepsis


- 2/19 s/p sigmoid colectoy with takedown of colocutaneous fistula, ostomy, and 

abdominal wall abscess drainage


- surgical recs: wound vac management 


- continue with zosyn and flagyl 


- clears


- Pain control, epidural, gabapentin, and dilaudid 





Anemia in conjunction with acute blood loss anemia


- likely iron deficiency 


- with ferritin increased due to inflammatory process


- patient on IV iron





Hypomagnesemia


- replace and check in AM





Thank you for allowing us to participate in the care of this pleasant patient.  

Do not hesitate to contact us with questions.  Someone can be reached from the 

[facility] hospitalist group all hours of the day at [phone] or via 

perfect serve.





Objective





- Vital Signs


Vital signs: 


                                   Vital Signs











Temp  98.2 F   02/20/21 07:00


 


Pulse  68   02/20/21 07:00


 


Resp  19   02/20/21 07:00


 


BP  99/59   02/20/21 07:00


 


Pulse Ox  90 L  02/20/21 07:00








                                 Intake & Output











 02/19/21 02/20/21 02/20/21





 18:59 06:59 18:59


 


Intake Total 2600  


 


Output Total 950 1725 


 


Balance 1650 -1725 


 


Weight 75.75 kg 75.75 kg 


 


Intake:   


 


  IV 2600  


 


Output:   


 


  Urine 850 1725 


 


  Estimated Blood Loss 100  


 


Other:   


 


  Voiding Method Toilet Indwelling Catheter 














- Labs


CBC & Chem 7: 


                                 02/20/21 06:36





                                 02/20/21 06:36


Labs: 


                  Abnormal Lab Results - Last 24 Hours (Table)











  02/19/21 02/19/21 02/20/21 Range/Units





  08:33 08:33 06:36 


 


WBC  13.52 H    (4.50-10.00)  X 10*3/uL


 


RBC  3.79 L    (4.40-5.60)  X 10*6/uL


 


Hgb  10.0 L    (13.0-17.0)  g/dL


 


Hct  32.7 L    (39.6-50.0)  %


 


MCH  26.4 L    (27.0-32.0)  pg


 


MCHC  30.6 L    (32.0-37.0)  g/dL


 


Immature Gran #  0.07 H    (0.00-0.04)  X 10*3/uL


 


Neutrophils #  12.28 H    (1.80-7.70)  X 10*3/uL


 


Lymphocytes #  0.50 L    (0.90-5.00)  X 10*3/uL


 


Eosinophils #  0.02 L    (0.04-0.35)  X 10*3/uL


 


Chloride   110 H   ()  mmol/L


 


Carbon Dioxide   19.8 L   (21.6-31.8)  mmol/L


 


Calcium   7.8 L  7.7 L  (8.7-10.3)  mg/dL














  02/20/21 Range/Units





  06:36 


 


WBC  11.39 H  (4.50-10.00)  X 10*3/uL


 


RBC  3.39 L  (4.40-5.60)  X 10*6/uL


 


Hgb  9.0 L  (13.0-17.0)  g/dL


 


Hct  28.8 L  (39.6-50.0)  %


 


MCH  26.5 L  (27.0-32.0)  pg


 


MCHC  31.3 L  (32.0-37.0)  g/dL


 


Immature Gran #  0.06 H  (0.00-0.04)  X 10*3/uL


 


Neutrophils #  9.90 H  (1.80-7.70)  X 10*3/uL


 


Lymphocytes #  0.76 L  (0.90-5.00)  X 10*3/uL


 


Eosinophils #  0.02 L  (0.04-0.35)  X 10*3/uL


 


Chloride   ()  mmol/L


 


Carbon Dioxide   (21.6-31.8)  mmol/L


 


Calcium   (8.7-10.3)  mg/dL








                      Microbiology - Last 24 Hours (Table)











 02/19/21 15:00 Gram Stain - Preliminary





 Abdomen Wound Culture - Preliminary


 


 02/17/21 21:20 Blood Culture - Preliminary





 Blood    No Growth after 48 hours


 


 02/19/21 15:00 Anaerobic Culture - Preliminary





 Abdomen

## 2021-02-21 LAB
ANION GAP SERPL CALC-SCNC: 3.9 MMOL/L (ref 4–12)
BASOPHILS # BLD AUTO: 0.04 X 10*3/UL (ref 0–0.1)
BASOPHILS NFR BLD AUTO: 0.3 %
BUN SERPL-SCNC: 10 MG/DL (ref 9–27)
BUN/CREAT SERPL: 16.67 RATIO (ref 12–20)
CALCIUM SPEC-MCNC: 7.8 MG/DL (ref 8.7–10.3)
CHLORIDE SERPL-SCNC: 105 MMOL/L (ref 96–109)
CO2 SERPL-SCNC: 29.1 MMOL/L (ref 21.6–31.8)
EOSINOPHIL # BLD AUTO: 0.1 X 10*3/UL (ref 0.04–0.35)
EOSINOPHIL NFR BLD AUTO: 0.8 %
ERYTHROCYTE [DISTWIDTH] IN BLOOD BY AUTOMATED COUNT: 3.3 X 10*6/UL (ref 4.4–5.6)
ERYTHROCYTE [DISTWIDTH] IN BLOOD: 14.2 % (ref 11.5–14.5)
GLUCOSE SERPL-MCNC: 102 MG/DL (ref 70–110)
HCT VFR BLD AUTO: 28.2 % (ref 39.6–50)
HGB BLD-MCNC: 8.7 G/DL (ref 13–17)
LYMPHOCYTES # SPEC AUTO: 0.8 X 10*3/UL (ref 0.9–5)
LYMPHOCYTES NFR SPEC AUTO: 6.6 %
MAGNESIUM SPEC-SCNC: 1.9 MG/DL (ref 1.5–2.4)
MCH RBC QN AUTO: 26.4 PG (ref 27–32)
MCHC RBC AUTO-ENTMCNC: 30.9 G/DL (ref 32–37)
MCV RBC AUTO: 85.5 FL (ref 80–97)
MONOCYTES # BLD AUTO: 0.55 X 10*3/UL (ref 0.2–1)
MONOCYTES NFR BLD AUTO: 4.5 %
NEUTROPHILS # BLD AUTO: 10.64 X 10*3/UL (ref 1.8–7.7)
NEUTROPHILS NFR BLD AUTO: 87.5 %
PLATELET # BLD AUTO: 376 X 10*3/UL (ref 140–440)
POTASSIUM SERPL-SCNC: 4.1 MMOL/L (ref 3.5–5.5)
SODIUM SERPL-SCNC: 138 MMOL/L (ref 135–145)
WBC # BLD AUTO: 12.17 X 10*3/UL (ref 4.5–10)

## 2021-02-21 RX ADMIN — PANTOPRAZOLE SODIUM SCH MG: 40 INJECTION, POWDER, FOR SOLUTION INTRAVENOUS at 07:31

## 2021-02-21 RX ADMIN — METRONIDAZOLE SCH MLS/HR: 500 INJECTION, SOLUTION INTRAVENOUS at 22:28

## 2021-02-21 RX ADMIN — PIPERACILLIN AND TAZOBACTAM SCH MLS/HR: 3; .375 INJECTION, POWDER, FOR SOLUTION INTRAVENOUS at 12:07

## 2021-02-21 RX ADMIN — CEFAZOLIN SCH MLS/HR: 330 INJECTION, POWDER, FOR SOLUTION INTRAMUSCULAR; INTRAVENOUS at 04:17

## 2021-02-21 RX ADMIN — ACETAMINOPHEN SCH: 500 TABLET ORAL at 06:19

## 2021-02-21 RX ADMIN — CEFAZOLIN SCH: 330 INJECTION, POWDER, FOR SOLUTION INTRAMUSCULAR; INTRAVENOUS at 15:14

## 2021-02-21 RX ADMIN — PIPERACILLIN AND TAZOBACTAM SCH MLS/HR: 3; .375 INJECTION, POWDER, FOR SOLUTION INTRAVENOUS at 04:16

## 2021-02-21 RX ADMIN — SODIUM CHLORIDE PRN MLS/HR: 9 INJECTION, SOLUTION INTRAVENOUS at 07:16

## 2021-02-21 RX ADMIN — TAMSULOSIN HYDROCHLORIDE SCH MG: 0.4 CAPSULE ORAL at 07:31

## 2021-02-21 RX ADMIN — GABAPENTIN SCH MG: 300 CAPSULE ORAL at 17:04

## 2021-02-21 RX ADMIN — ENOXAPARIN SODIUM SCH MG: 30 INJECTION SUBCUTANEOUS at 07:31

## 2021-02-21 RX ADMIN — METRONIDAZOLE SCH MLS/HR: 500 INJECTION, SOLUTION INTRAVENOUS at 04:16

## 2021-02-21 RX ADMIN — ONDANSETRON PRN MG: 2 INJECTION INTRAMUSCULAR; INTRAVENOUS at 04:23

## 2021-02-21 RX ADMIN — METRONIDAZOLE SCH MLS/HR: 500 INJECTION, SOLUTION INTRAVENOUS at 12:07

## 2021-02-21 RX ADMIN — ACETAMINOPHEN SCH: 500 TABLET ORAL at 17:04

## 2021-02-21 RX ADMIN — METRONIDAZOLE SCH MLS/HR: 500 INJECTION, SOLUTION INTRAVENOUS at 17:04

## 2021-02-21 RX ADMIN — GABAPENTIN SCH: 300 CAPSULE ORAL at 22:10

## 2021-02-21 RX ADMIN — ACETAMINOPHEN SCH: 500 TABLET ORAL at 12:07

## 2021-02-21 RX ADMIN — PIPERACILLIN AND TAZOBACTAM SCH MLS/HR: 3; .375 INJECTION, POWDER, FOR SOLUTION INTRAVENOUS at 20:14

## 2021-02-21 RX ADMIN — METOCLOPRAMIDE PRN MG: 5 INJECTION, SOLUTION INTRAMUSCULAR; INTRAVENOUS at 07:31

## 2021-02-21 RX ADMIN — CEFAZOLIN SCH MLS/HR: 330 INJECTION, POWDER, FOR SOLUTION INTRAMUSCULAR; INTRAVENOUS at 22:08

## 2021-02-21 RX ADMIN — GABAPENTIN SCH MG: 300 CAPSULE ORAL at 07:31

## 2021-02-21 NOTE — P.PN
Subjective


Progress Note Date: 02/21/21


Principal diagnosis: 


Abdominal pain





Patient is a 61 yo M with a hx of crohns disease initially diagnosed 40 year ago

currently treating with homeopathic options who initially presented to his PCP 

in [place] with left groin pain, they were concerned for hernia that was 

not reducible and sent him to the hospital. He was admitted to surgery and 

underwent CT abdomen and pelvis which showed abscess and fistula tract. On 2/19 

he underwent sigmoid colectomy with takedown of colocutaneous fistula, ostomy, 

and abdominal wall abscess drainage. He tolerated the procedure well. He was 

advanced to a clear liquid and then a full liquid diet. His pain has been well 

controlled post op with his epidural. No ostomy output.  





Patient seen and examined at bedside. + nausea and vomiting this AM. Had heart 

burn all night after the beef broth. Feels better after vomiting. He reports t

hat pain is well controlled 2-3/10 mostly LLQ and worse with 

coughing/movement/pain. Also having increased itching- suggested benadryl use. 

All questions answered. 





General: ill appearing, mild distress, appears at stated age


Derm: warm, dry


Head: atraumatic, normocephalic, symmetric


Eyes: EOMI, no lid lag, anicteric sclera


Mouth: no lip lesion, mucus membranes moist


Cardiovascular: S1S2 reg, no murmur, positive posterior tibial pulse bilateral, 


Lungs:Decrease bs bilateral, no rhonchi, no rales , no accessory muscle use


Abdominal: soft,  + tender to palpation LLQ, no guarding, no appreciable 

organomegaly, wound vac midline and LLQ with erythema in the LLQ, Ostomy dark 

with no significant output. cloudy brown urine


Ext: no gross muscle atrophy, no edema, no contractures


Neuro:  CN II-XI grossly intact, no focal neuro deficits


Psych: Alert, oriented, appropriate affect 


 





Sigmoid colitis with fistulas tract secondary to crohn's disease and large 

subcutaneous abscess (gram negative) with sepsis


- 2/19 s/p sigmoid colectoy with takedown of colocutaneous fistula, ostomy, and 

abdominal wall abscess drainage


- surgical recs: wound vac management, full liquids


- ID recs


- await final cultures: GNB 


- continue with zosyn and flagyl 


- benadryl for itching


- Pain control, epidural, gabapentin, and dilaudid 


- monitor urine output 





Anemia in conjunction with acute blood loss anemia


- likely iron deficiency 


- with ferritin increased due to inflammatory process


- patient on IV iron





Hypomagnesemia, resolved 








Thank you for allowing us to participate in the care of this pleasant patient.  

Do not hesitate to contact us with questions.  Someone can be reached from the 

[facility] hospitalist group all hours of the day at [phone] or via 

perfect serve.





Objective





- Vital Signs


Vital signs: 


                                   Vital Signs











Temp  98.2 F   02/21/21 07:51


 


Pulse  56 L  02/21/21 07:51


 


Resp  20   02/21/21 07:51


 


BP  107/63   02/21/21 07:51


 


Pulse Ox  91 L  02/21/21 07:51








                                 Intake & Output











 02/20/21 02/21/21 02/21/21





 18:59 06:59 18:59


 


Intake Total 1080  


 


Output Total 200 450 


 


Balance 880 -450 


 


Intake:   


 


  Oral 1080  


 


Output:   


 


  Urine 200 450 


 


Other:   


 


  Voiding Method Indwelling Catheter Indwelling Catheter 














- Labs


CBC & Chem 7: 


                                 02/21/21 06:09





                                 02/21/21 06:09


Labs: 


                  Abnormal Lab Results - Last 24 Hours (Table)











  02/20/21 02/20/21 Range/Units





  06:36 06:36 


 


WBC   11.39 H  (4.50-10.00)  X 10*3/uL


 


RBC   3.39 L  (4.40-5.60)  X 10*6/uL


 


Hgb   9.0 L  (13.0-17.0)  g/dL


 


Hct   28.8 L  (39.6-50.0)  %


 


MCH   26.5 L  (27.0-32.0)  pg


 


MCHC   31.3 L  (32.0-37.0)  g/dL


 


Immature Gran #   0.06 H  (0.00-0.04)  X 10*3/uL


 


Neutrophils #   9.90 H  (1.80-7.70)  X 10*3/uL


 


Lymphocytes #   0.76 L  (0.90-5.00)  X 10*3/uL


 


Eosinophils #   0.02 L  (0.04-0.35)  X 10*3/uL


 


Calcium  7.7 L   (8.7-10.3)  mg/dL








                      Microbiology - Last 24 Hours (Table)











 02/17/21 21:20 Blood Culture - Preliminary





 Blood    No Growth after 72 hours


 


 02/19/21 15:00 Gram Stain - Preliminary





 Abdomen Wound Culture - Preliminary





    Gram Neg Bacilli

## 2021-02-21 NOTE — P.PN
Subjective


Progress Note Date: 02/21/21


Principal diagnosis: 





Crohn's disease, colocutaneous fistula, abscess





Patient is seen lying in bed status post exploratory laparotomy with sigmoid 

colectomy and descending ostomy formation, with wound VAC currently in place.  

No acute complaints, he has tolerated some liquids but reports some nausea this 

morning.





Objective





- Vital Signs


Vital signs: 


                                   Vital Signs











Temp  98.2 F   02/21/21 07:51


 


Pulse  56 L  02/21/21 07:51


 


Resp  20   02/21/21 07:51


 


BP  107/63   02/21/21 07:51


 


Pulse Ox  91 L  02/21/21 07:51








                                 Intake & Output











 02/20/21 02/21/21 02/21/21





 18:59 06:59 18:59


 


Intake Total 1080  


 


Output Total 200 450 


 


Balance 880 -450 


 


Intake:   


 


  Oral 1080  


 


Output:   


 


  Urine 200 450 


 


Other:   


 


  Voiding Method Indwelling Catheter Indwelling Catheter Indwelling Catheter














- Exam





On physical examination, patient appears comfortable in no apparent distress. 


HEAD: Normocephalic, atraumatic. 


EYES: No scleral icterus. No conjunctival injection. 


MOUTH: No lesions, tongue midline. 


NECK: Trachea midline, no gross abnormalities. 


ABDOMEN: Soft, colostomy intact with wound VAC. Bowel sounds are positive. No 

organomegaly.  No guarding or rigidity.


EXTREMITIES: No pedal edema. 


SKIN: No rashes, no jaundice. 


NEUROLOGIC: Alert and oriented x3.  No focal deficits. 





- Labs


CBC & Chem 7: 


                                 02/21/21 06:09





                                 02/21/21 06:09


Labs: 


                  Abnormal Lab Results - Last 24 Hours (Table)











  02/21/21 02/21/21 Range/Units





  06:09 06:09 


 


WBC  12.17 H   (4.50-10.00)  X 10*3/uL


 


RBC  3.30 L   (4.40-5.60)  X 10*6/uL


 


Hgb  8.7 L   (13.0-17.0)  g/dL


 


Hct  28.2 L   (39.6-50.0)  %


 


MCH  26.4 L   (27.0-32.0)  pg


 


MCHC  30.9 L   (32.0-37.0)  g/dL


 


MPV  9.4 L   (9.5-12.2)  fL


 


Neutrophils #  10.64 H   (1.80-7.70)  X 10*3/uL


 


Lymphocytes #  0.80 L   (0.90-5.00)  X 10*3/uL


 


Anion Gap   3.90 L  (4.00-12.00)  mmol/L


 


Calcium   7.8 L  (8.7-10.3)  mg/dL








                      Microbiology - Last 24 Hours (Table)











 02/17/21 21:20 Blood Culture - Preliminary





 Blood    No Growth after 72 hours


 


 02/19/21 15:00 Gram Stain - Preliminary





 Abdomen Wound Culture - Preliminary





    Gram Neg Bacilli














Assessment and Plan


(1) Colocutaneous fistula


Narrative/Plan: 


60-year-old male with known history of Crohn's disease nonmedical treatment 

status post sigmoid colectomy with descending ostomy formation and wound VAC.


Current Visit: Yes   Status: Acute   Code(s): K63.2 - FISTULA OF INTESTINE   

SNOMED Code(s): 970573984


   





(2) Abdominal wall abscess


Current Visit: Yes   Status: Acute   Code(s): L02.211 - CUTANEOUS ABSCESS OF 

ABDOMINAL WALL   SNOMED Code(s): 77943783


   





(3) Crohn's disease


Current Visit: Yes   Status: Acute   Code(s): K50.90 - CROHN'S DISEASE, 

UNSPECIFIED, WITHOUT COMPLICATIONS   SNOMED Code(s): 55171197


   


Plan: 





Supportive care


Diet as per surgical recommendations


Continue respiratory antibiotic therapy


Continue monitor CBC, BMP, LFTs


Continue local care


Patient will need follow-up after discharge with gastroenterology for discussion

about initiation of maintenance therapy given her complicated Crohn's disease


Thank you for allowing us to participate in the care of the patient

## 2021-02-21 NOTE — P.PN
Progress Note - Text


Progress Note Date: 02/21/21








Postoperative day  #2 status post  explaretory laparotomy, sigmoid 

colectomy/epidural catheter placed for postoperative analgesia, patient doing 

well epidural site okay, patient currently on combination of epidural infusion 

solution of Ropivacaine 0.0625% and Dilaudid 20 g per mL the infusion rate at  

5 ml per hour , patient had no motor deficit epidural site okay , vital signs 

stable ,VAS 2 /10   , 


Assessment and plan= post operative day  #2  patient doing well   ,pain well co

ntrolled , there is no anesthesia related complications

## 2021-02-21 NOTE — P.PN
Subjective


Progress Note Date: 02/21/21


Principal diagnosis: 





Colocutaneous fistula





Patient complaining of nausea today.  Have an episode of vomiting earlier this 

morning.  Minimal ostomy output.  Dressing remains to suction.  Says his pain is

mild.





Objective





- Vital Signs


Vital signs: 


                                   Vital Signs











Temp  98.2 F   02/21/21 07:51


 


Pulse  56 L  02/21/21 07:51


 


Resp  20   02/21/21 07:51


 


BP  107/63   02/21/21 07:51


 


Pulse Ox  91 L  02/21/21 07:51








                                 Intake & Output











 02/20/21 02/21/21 02/21/21





 18:59 06:59 18:59


 


Intake Total 1080  


 


Output Total 200 450 


 


Balance 880 -450 


 


Intake:   


 


  Oral 1080  


 


Output:   


 


  Urine 200 450 


 


Other:   


 


  Voiding Method Indwelling Catheter Indwelling Catheter Indwelling Catheter














- Exam





Abdomen: Soft, nondistended, dressing intact, ostomy pink





- Labs


CBC & Chem 7: 


                                 02/21/21 06:09





                                 02/21/21 06:09


Labs: 


                  Abnormal Lab Results - Last 24 Hours (Table)











  02/21/21 02/21/21 Range/Units





  06:09 06:09 


 


WBC  12.17 H   (4.50-10.00)  X 10*3/uL


 


RBC  3.30 L   (4.40-5.60)  X 10*6/uL


 


Hgb  8.7 L   (13.0-17.0)  g/dL


 


Hct  28.2 L   (39.6-50.0)  %


 


MCH  26.4 L   (27.0-32.0)  pg


 


MCHC  30.9 L   (32.0-37.0)  g/dL


 


MPV  9.4 L   (9.5-12.2)  fL


 


Neutrophils #  10.64 H   (1.80-7.70)  X 10*3/uL


 


Lymphocytes #  0.80 L   (0.90-5.00)  X 10*3/uL


 


Anion Gap   3.90 L  (4.00-12.00)  mmol/L


 


Calcium   7.8 L  (8.7-10.3)  mg/dL








                      Microbiology - Last 24 Hours (Table)











 02/17/21 21:20 Blood Culture - Preliminary





 Blood    No Growth after 72 hours


 


 02/19/21 15:00 Gram Stain - Preliminary





 Abdomen Wound Culture - Preliminary





    Gram Neg Bacilli














Assessment and Plan


(1) Colocutaneous fistula


Narrative/Plan: 


Will make patient nothing by mouth for now.  Increase activity as tolerated.  

Patient with cloudy urine this morning with possible particulate matter.  We'll 

monitor.


Current Visit: Yes   Status: Acute   Code(s): K63.2 - FISTULA OF INTESTINE   

SNOMED Code(s): 997719637

## 2021-02-22 LAB
BASOPHILS # BLD AUTO: 0.04 X 10*3/UL (ref 0–0.1)
BASOPHILS NFR BLD AUTO: 0.3 %
EOSINOPHIL # BLD AUTO: 0.14 X 10*3/UL (ref 0.04–0.35)
EOSINOPHIL NFR BLD AUTO: 1.1 %
ERYTHROCYTE [DISTWIDTH] IN BLOOD BY AUTOMATED COUNT: 3.54 X 10*6/UL (ref 4.4–5.6)
ERYTHROCYTE [DISTWIDTH] IN BLOOD: 14.3 % (ref 11.5–14.5)
HCT VFR BLD AUTO: 30.4 % (ref 39.6–50)
HGB BLD-MCNC: 9.5 G/DL (ref 13–17)
LYMPHOCYTES # SPEC AUTO: 1.14 X 10*3/UL (ref 0.9–5)
LYMPHOCYTES NFR SPEC AUTO: 8.9 %
MCH RBC QN AUTO: 26.8 PG (ref 27–32)
MCHC RBC AUTO-ENTMCNC: 31.3 G/DL (ref 32–37)
MCV RBC AUTO: 85.9 FL (ref 80–97)
MONOCYTES # BLD AUTO: 0.7 X 10*3/UL (ref 0.2–1)
MONOCYTES NFR BLD AUTO: 5.5 %
NEUTROPHILS # BLD AUTO: 10.62 X 10*3/UL (ref 1.8–7.7)
NEUTROPHILS NFR BLD AUTO: 83.4 %
PLATELET # BLD AUTO: 461 X 10*3/UL (ref 140–440)
WBC # BLD AUTO: 12.74 X 10*3/UL (ref 4.5–10)

## 2021-02-22 RX ADMIN — PIPERACILLIN AND TAZOBACTAM SCH MLS/HR: 3; .375 INJECTION, POWDER, FOR SOLUTION INTRAVENOUS at 13:20

## 2021-02-22 RX ADMIN — GABAPENTIN SCH: 300 CAPSULE ORAL at 08:14

## 2021-02-22 RX ADMIN — DEXAMETHASONE SODIUM PHOSPHATE SCH: 4 INJECTION, SOLUTION INTRAMUSCULAR; INTRAVENOUS at 17:58

## 2021-02-22 RX ADMIN — DEXAMETHASONE SODIUM PHOSPHATE SCH MG: 4 INJECTION, SOLUTION INTRAMUSCULAR; INTRAVENOUS at 22:44

## 2021-02-22 RX ADMIN — DEXAMETHASONE SODIUM PHOSPHATE SCH MG: 4 INJECTION, SOLUTION INTRAMUSCULAR; INTRAVENOUS at 13:21

## 2021-02-22 RX ADMIN — METOCLOPRAMIDE SCH MG: 5 INJECTION, SOLUTION INTRAMUSCULAR; INTRAVENOUS at 22:44

## 2021-02-22 RX ADMIN — PANTOPRAZOLE SODIUM SCH MG: 40 INJECTION, POWDER, FOR SOLUTION INTRAVENOUS at 08:14

## 2021-02-22 RX ADMIN — PIPERACILLIN AND TAZOBACTAM SCH MLS/HR: 3; .375 INJECTION, POWDER, FOR SOLUTION INTRAVENOUS at 22:44

## 2021-02-22 RX ADMIN — ACETAMINOPHEN SCH: 500 TABLET ORAL at 17:16

## 2021-02-22 RX ADMIN — GABAPENTIN SCH: 300 CAPSULE ORAL at 22:43

## 2021-02-22 RX ADMIN — PIPERACILLIN AND TAZOBACTAM SCH MLS/HR: 3; .375 INJECTION, POWDER, FOR SOLUTION INTRAVENOUS at 04:20

## 2021-02-22 RX ADMIN — ONDANSETRON SCH MG: 2 INJECTION INTRAMUSCULAR; INTRAVENOUS at 13:20

## 2021-02-22 RX ADMIN — CEFAZOLIN SCH MLS/HR: 330 INJECTION, POWDER, FOR SOLUTION INTRAMUSCULAR; INTRAVENOUS at 22:45

## 2021-02-22 RX ADMIN — METRONIDAZOLE SCH MLS/HR: 500 INJECTION, SOLUTION INTRAVENOUS at 04:19

## 2021-02-22 RX ADMIN — ACETAMINOPHEN SCH: 500 TABLET ORAL at 00:16

## 2021-02-22 RX ADMIN — METOCLOPRAMIDE SCH MG: 5 INJECTION, SOLUTION INTRAMUSCULAR; INTRAVENOUS at 13:20

## 2021-02-22 RX ADMIN — ACETAMINOPHEN SCH: 500 TABLET ORAL at 05:29

## 2021-02-22 RX ADMIN — GABAPENTIN SCH: 300 CAPSULE ORAL at 17:15

## 2021-02-22 RX ADMIN — SODIUM CHLORIDE PRN MLS/HR: 9 INJECTION, SOLUTION INTRAVENOUS at 09:06

## 2021-02-22 RX ADMIN — METOCLOPRAMIDE PRN MG: 5 INJECTION, SOLUTION INTRAMUSCULAR; INTRAVENOUS at 08:14

## 2021-02-22 RX ADMIN — ACETAMINOPHEN SCH: 500 TABLET ORAL at 13:21

## 2021-02-22 RX ADMIN — ONDANSETRON SCH: 2 INJECTION INTRAMUSCULAR; INTRAVENOUS at 17:16

## 2021-02-22 RX ADMIN — ENOXAPARIN SODIUM SCH MG: 30 INJECTION SUBCUTANEOUS at 08:14

## 2021-02-22 RX ADMIN — TAMSULOSIN HYDROCHLORIDE SCH MG: 0.4 CAPSULE ORAL at 08:14

## 2021-02-22 NOTE — P.PN
Subjective


Progress Note Date: 02/22/21





Patient is awake and alert today.  He denies any abdominal pain.  He is having 

minimal output through the ostomy.  No acute events overnight reported by 

nursing staff.





Objective





- Vital Signs


Vital signs: 


                                   Vital Signs











Temp  98.3 F   02/22/21 08:00


 


Pulse  63   02/22/21 08:00


 


Resp  18   02/22/21 08:00


 


BP  132/71   02/22/21 08:00


 


Pulse Ox  91 L  02/22/21 08:00








                                 Intake & Output











 02/21/21 02/22/21 02/22/21





 18:59 06:59 18:59


 


Intake Total  0 129.167


 


Output Total  500 


 


Balance  -500 129.167


 


Intake:   


 


  Intake, IV Titration   129.167





  Amount   


 


    Ropivacaine 250 mg   129.167





    Hydromorphone (Pf) 5 mg   





    In Sodium Chloride 0.9%   





    200 ml @ Per Protocol   





    EPIDURAL .Q0M PRN Rx#:   





    [id number]   


 


  Oral  0 


 


Output:   


 


  Urine  500 


 


  Other  0 


 


Other:   


 


  Voiding Method Indwelling Catheter Indwelling Catheter 














- Exam





General:  The patient is awake and alert, in no distress


Eye: there is normal conjunctiva bilaterally.  


Neck:  The neck is supple, there is no  JVD.  


Cardiovascular:  Normal  S1-S2, no S3-S4, no murmurs.


Respiratory: Lungs clear to auscultation bilaterally


Gastrointestinal: Abdomen is soft, nontender.  Ostomy in place.


Musculoskeletal:  There is no pedal edema.  


Neurological:. Speech is normal. 


Skin:  Skin is warm and dry 





- Labs


CBC & Chem 7: 


                                 02/22/21 05:21





                                 02/21/21 06:09


Labs: 


                  Abnormal Lab Results - Last 24 Hours (Table)











  02/22/21 Range/Units





  05:21 


 


WBC  12.74 H  (4.50-10.00)  X 10*3/uL


 


RBC  3.54 L  (4.40-5.60)  X 10*6/uL


 


Hgb  9.5 L  (13.0-17.0)  g/dL


 


Hct  30.4 L  (39.6-50.0)  %


 


MCH  26.8 L  (27.0-32.0)  pg


 


MCHC  31.3 L  (32.0-37.0)  g/dL


 


Plt Count  461 H  (140-440)  X 10*3/uL


 


MPV  9.3 L  (9.5-12.2)  fL


 


Immature Gran #  0.10 H  (0.00-0.04)  X 10*3/uL


 


Neutrophils #  10.62 H  (1.80-7.70)  X 10*3/uL








                      Microbiology - Last 24 Hours (Table)











 02/17/21 21:20 Blood Culture - Preliminary





 Blood    No Growth after 96 hours


 


 02/19/21 15:00 Gram Stain - Final





 Abdomen Wound Culture - Final





    Escherichia coli














Assessment and Plan


Assessment: 





Patient is a 61 yo M with a hx of crohns disease initially diagnosed 40 year ago

currently treating with homeopathic options who initially presented to his PCP 

in [place] with left groin pain, they were concerned for hernia that was 

not reducible and sent him to the hospital. He was admitted to surgery and 

underwent CT abdomen and pelvis which showed abscess and fistula tract. On 2/19 

he underwent sigmoid colectomy with takedown of colocutaneous fistula, ostomy, 

and abdominal wall abscess drainage. He tolerated the procedure well. He was 

advanced to a clear liquid   








Sigmoid colitis with fistulas tract secondary to crohn's disease and large 

subcutaneous abscess (gram negative) with sepsis


- 2/19 s/p sigmoid colectoy with takedown of colocutaneous fistula, ostomy, and 

abdominal wall abscess drainage


- surgical recs: wound vac management, full liquids


- ID recs


- await final cultures: GNB 


- continue with zosyn 


- benadryl for itching


- Pain control, epidural, gabapentin, and dilaudid 


- monitor urine output 





Iron deficiency anemia





Hypomagnesemia, resolved 








Discontinue Flagyl

## 2021-02-22 NOTE — P.PN
Progress Note - Text


Progress Note Date: 02/22/21








Postoperative day  #3 status post  explaretory laparotomy, sigmoid 

colectomy/epidural catheter placed for postoperative analgesia, patient doing 

well epidural site okay, patient currently on combination of epidural infusion 

solution of Ropivacaine 0.0625% and Dilaudid 20 g per mL the infusion rate at  

5 ml per hour , patient had no motor deficit epidural site okay , vital signs 

stable ,VAS 2 /10   , 


Assessment and plan= post operative day  #3  patient doing well   ,pain well co

ntrolled , there is no anesthesia related complications

## 2021-02-22 NOTE — P.PN
Progress Note - Text


Progress Note Date: 02/22/21





Patient seen and evaluated with ostomy nurseLiane.





Ostomy patent.  Ostomy pink and functioning.  No retraction of ostomy noted. 





Patient's nurse at bedside including his wife is at bedside.  Patient has yet to

ambulate since surgery.  Ambulation encouraged.  Additionally, patient had 

pre-existing ascites.  He is at risk for secondary infection of his ascites.  





May repeat CT of the abdomen and pelvis tomorrow.  May need or drainage of 

intra-abdominal fluid collection.  Patient and family at bedside with care plan 

reviewed.  No further erythema at previous left lower quadrant complex abdominal

wall abscess.  Packing of Aquasol Ag discontinued.  





Recommend Optifoam dressing.

## 2021-02-22 NOTE — P.PN
Subjective


Progress Note Date: 02/22/21


Principal diagnosis: 





Abdominal abscess, Crohn's disease





Patient seen and examined lying in bed.  He states he had some nausea over the 

weekend and vomited yesterday.  He remains nothing by mouth with ice chips.  He 

has small amount of output from ostomy.  Abdominal incision with wound VAC with 

drainage.  Pain is being managed by pain pump.





Objective





- Vital Signs


Vital signs: 


                                   Vital Signs











Temp  98.3 F   02/22/21 08:00


 


Pulse  63   02/22/21 08:00


 


Resp  18   02/22/21 08:00


 


BP  132/71   02/22/21 08:00


 


Pulse Ox  91 L  02/22/21 08:00








                                 Intake & Output











 02/21/21 02/22/21 02/22/21





 18:59 06:59 18:59


 


Intake Total  0 129.167


 


Output Total  500 


 


Balance  -500 129.167


 


Intake:   


 


  Intake, IV Titration   129.167





  Amount   


 


    Ropivacaine 250 mg   129.167





    Hydromorphone (Pf) 5 mg   





    In Sodium Chloride 0.9%   





    200 ml @ Per Protocol   





    EPIDURAL .Q0M PRN Rx#:   





    [id number]   


 


  Oral  0 


 


Output:   


 


  Urine  500 


 


  Other  0 


 


Other:   


 


  Voiding Method Indwelling Catheter Indwelling Catheter 














- Exam





General appearance: The patient is alert, oriented, appears in no acute 

distress.


HET: Head is normocephalic and atraumatic.  Conjunctiva pink.  Sclera anicteric.


Neck: Supple without lymphadenopathy.  


Abdomen: Soft, mild tenderness, decreased bowel sounds, ostomy intact, wound VAC

in place with suction.


Extremities: Normal skin color and turgor.  No pedal edema


Skin: No rashes, no jaundice


Neurological: No focal deficits.  Alert and oriented 3.





- Labs


CBC & Chem 7: 


                                 02/22/21 05:21





                                 02/21/21 06:09


Labs: 


                  Abnormal Lab Results - Last 24 Hours (Table)











  02/22/21 Range/Units





  05:21 


 


WBC  12.74 H  (4.50-10.00)  X 10*3/uL


 


RBC  3.54 L  (4.40-5.60)  X 10*6/uL


 


Hgb  9.5 L  (13.0-17.0)  g/dL


 


Hct  30.4 L  (39.6-50.0)  %


 


MCH  26.8 L  (27.0-32.0)  pg


 


MCHC  31.3 L  (32.0-37.0)  g/dL


 


Plt Count  461 H  (140-440)  X 10*3/uL


 


MPV  9.3 L  (9.5-12.2)  fL


 


Immature Gran #  0.10 H  (0.00-0.04)  X 10*3/uL


 


Neutrophils #  10.62 H  (1.80-7.70)  X 10*3/uL








                      Microbiology - Last 24 Hours (Table)











 02/17/21 21:20 Blood Culture - Preliminary





 Blood    No Growth after 96 hours


 


 02/19/21 15:00 Gram Stain - Final





 Abdomen Wound Culture - Final





    Escherichia coli














Assessment and Plan


(1) Colocutaneous fistula


Narrative/Plan: 


This is a 60-year-old male with a known history of Crohn's disease with 

nonmedical treatment status post sigmoid colectomy with descending ostomy 

formation and wound VAC


Current Visit: Yes   Status: Acute   Code(s): K63.2 - FISTULA OF INTESTINE   

SNOMED Code(s): 725910102


   





(2) Abdominal wall abscess


Current Visit: Yes   Status: Acute   Code(s): L02.211 - CUTANEOUS ABSCESS OF 

ABDOMINAL WALL   SNOMED Code(s): 00514418


   





(3) Crohn's disease


Narrative/Plan: 


This is a 60-year-old gentleman who has a history of Crohn's disease, Crohn's 

colitis, and has been in clinical remission for last 22 years.  He was diagnosed

40 years ago.  States his last colonoscopy was 22 years ago.  Patient presently 

on no maintenance medications.


Current Visit: Yes   Status: Acute   Code(s): K50.90 - CROHN'S DISEASE, 

UNSPECIFIED, WITHOUT COMPLICATIONS   SNOMED Code(s): 00598649


   


Plan: 





Supportive care


Diet as per surgical recommendations


Continue to monitor CBC and BMP


Was discussed with the patient he'll need close follow-up after discharge with 

gastroenterology for discussion about initiation of maintenance therapy for his 

complicated Crohn's disease


Thank you for this consultation, we will sign off at this time











Dr. Bartlett


I agree with the dictator's note, documented as a scribe by Ruchi MERRITT.

## 2021-02-22 NOTE — P.PN
Subjective


Progress Note Date: 02/22/21














CHIEF COMPLAINT: Colocutaneous fistula complicated abdominal wall abscess





HISTORY OF PRESENT ILLNESS: The patient is a 60-year-old male who presented with

sepsis including colocutaneous posterior with history of Crohn's disease.  He 

status post open takedown of colocutaneous fistula with Trujillo's procedure and 

drainage of left lower quadrant abdominal wall abscess, 02/19/2021.  Patient 

only had abscess of colon to abdominal wall.  Rest of the abdomen explored 

without any other findings of fistulas.  Patient has been in bed since surgery. 

He denies any ambulation or sitting up in a chair.  He has a epidural.  Pain is 

tolerable.  He reports severe heartburn.





REVIEW OF ORGAN SYSTEMS: No fevers or chills, no chest pain or shortness of 

breath, no productive sputum





PHYSICAL EXAM:


VITALS: Reviewed


CONSTITUTIONAL:  Well developed and in no acute distress. 


EYES:  Conjuctivae without sclera icterus. Extraocular movements grossly intact.




HEAD, EARS, NOSE, THROAT: Moist buccal mucosa. Head is atraumatic, 

normocephalic. Hears conversational speech. No nasal drainage. 


RESPIRATORY:  Non-labored respirations and equal bilateral excursions. No gross 

wheezes. 


CARDIOVASCULAR:  Regular rate and rhythm.  


ABDOMEN:  Soft. No peritonitis.  Resolved left lower quadrant erythema including

fluctuance. PREVENA incisional wound VAC intact.


MUSCULOSKELETAL: Has clubbing of the fingers.  No diffuse lower extremity edema.


SKIN:  Warm and well perfused with good skin turgor.


NEUROLOGIC: Cranial nerves II through XII grossly intact. No focal or 

lateralizing signs. 


PSYCH:  Appropriate affect.  Alert and oriented to person, place and time. 

Displays appropriate insight.


: Vigil present with dark urine.





CLINCAL LABS: WBC elevated at over 12,000.  Hemoglobin 9.5.  Prior to surgery 

hemoglobin 10.0.





ASSESSMENT: 


1.  Sepsis with colocutaneous fistula


2.  Crohn's, complicated


3.  Lack of medical care


4.  Family history of cardiovascular disease including a stroke


5.  Iron deficiency anemia


6.  Gastroesophageal reflux disease








PLAN: 


1.  Infectious disease consultation has been obtained.  Cultures thus for 

sensitive to Zosyn.  He is on Zosyn and Flagyl.


2.  Patient has no evidence of active bleeding.  He has pre-existing iron 

deficiency anemia, with hemoglobin of 10.0 prior to any surgical intervention.


3.  We'll coordinate with ostomy nurse for takedown of ostomy including VAC 

dressing.


4.  Overall expected delayed bowel function due to chronicity of colocutaneous 

fistula with sepsis on presentation


5.  Will start scheduled antiemetics with prokinetic





Objective





- Vital Signs


Vital signs: 


                                   Vital Signs











Temp  98.3 F   02/22/21 08:00


 


Pulse  63   02/22/21 08:00


 


Resp  18   02/22/21 08:00


 


BP  132/71   02/22/21 08:00


 


Pulse Ox  91 L  02/22/21 08:00








                                 Intake & Output











 02/21/21 02/22/21 02/22/21





 18:59 06:59 18:59


 


Intake Total  0 129.167


 


Output Total  500 


 


Balance  -500 129.167


 


Intake:   


 


  Intake, IV Titration   129.167





  Amount   


 


    Ropivacaine 250 mg   129.167





    Hydromorphone (Pf) 5 mg   





    In Sodium Chloride 0.9%   





    200 ml @ Per Protocol   





    EPIDURAL .Q0M PRN Rx#:   





    [id number]   


 


  Oral  0 


 


Output:   


 


  Urine  500 


 


  Other  0 


 


Other:   


 


  Voiding Method Indwelling Catheter Indwelling Catheter 














- Labs


CBC & Chem 7: 


                                 02/22/21 05:21





                                 02/21/21 06:09


Labs: 


                  Abnormal Lab Results - Last 24 Hours (Table)











  02/21/21 02/22/21 Range/Units





  06:09 05:21 


 


WBC   12.74 H  (4.50-10.00)  X 10*3/uL


 


RBC   3.54 L  (4.40-5.60)  X 10*6/uL


 


Hgb   9.5 L  (13.0-17.0)  g/dL


 


Hct   30.4 L  (39.6-50.0)  %


 


MCH   26.8 L  (27.0-32.0)  pg


 


MCHC   31.3 L  (32.0-37.0)  g/dL


 


Plt Count   461 H  (140-440)  X 10*3/uL


 


MPV   9.3 L  (9.5-12.2)  fL


 


Immature Gran #   0.10 H  (0.00-0.04)  X 10*3/uL


 


Neutrophils #   10.62 H  (1.80-7.70)  X 10*3/uL


 


Anion Gap  3.90 L   (4.00-12.00)  mmol/L


 


Calcium  7.8 L   (8.7-10.3)  mg/dL








                      Microbiology - Last 24 Hours (Table)











 02/17/21 21:20 Blood Culture - Preliminary





 Blood    No Growth after 96 hours


 


 02/19/21 15:00 Gram Stain - Final





 Abdomen Wound Culture - Final





    Escherichia coli














Assessment and Plan


(1) Left lower quadrant pain


Current Visit: Yes   Status: Acute   Code(s): R10.32 - LEFT LOWER QUADRANT PAIN 

 SNOMED Code(s): 324882984


   





(2) Crohn's disease


Current Visit: Yes   Status: Acute   Code(s): K50.90 - CROHN'S DISEASE, 

UNSPECIFIED, WITHOUT COMPLICATIONS   SNOMED Code(s): 44905596


   





(3) Leukocytosis


Current Visit: Yes   Status: Acute   Code(s): D72.829 - ELEVATED WHITE BLOOD 

CELL COUNT, UNSPECIFIED   SNOMED Code(s): 097799385


   





(4) Anemia, iron deficiency


Current Visit: Yes   Status: Acute   Code(s): D50.9 - IRON DEFICIENCY ANEMIA, UN

SPECIFIED   SNOMED Code(s): 30663180


   





(5) Sepsis


Current Visit: Yes   Status: Acute   Code(s): A41.9 - SEPSIS, UNSPECIFIED 

ORGANISM   SNOMED Code(s): 57129654


   





(6) Colocutaneous fistula


Current Visit: Yes   Status: Acute   Code(s): K63.2 - FISTULA OF INTESTINE   

SNOMED Code(s): 067503408

## 2021-02-23 LAB
ANION GAP SERPL CALC-SCNC: 7.4 MMOL/L (ref 4–12)
BASOPHILS # BLD AUTO: 0.03 X 10*3/UL (ref 0–0.1)
BASOPHILS NFR BLD AUTO: 0.2 %
BUN SERPL-SCNC: 15 MG/DL (ref 9–27)
BUN/CREAT SERPL: 25 RATIO (ref 12–20)
CALCIUM SPEC-MCNC: 7.7 MG/DL (ref 8.7–10.3)
CHLORIDE SERPL-SCNC: 107 MMOL/L (ref 96–109)
CO2 SERPL-SCNC: 25.6 MMOL/L (ref 21.6–31.8)
EOSINOPHIL # BLD AUTO: 0.01 X 10*3/UL (ref 0.04–0.35)
EOSINOPHIL NFR BLD AUTO: 0.1 %
ERYTHROCYTE [DISTWIDTH] IN BLOOD BY AUTOMATED COUNT: 3.52 X 10*6/UL (ref 4.4–5.6)
ERYTHROCYTE [DISTWIDTH] IN BLOOD: 14.2 % (ref 11.5–14.5)
GLUCOSE SERPL-MCNC: 94 MG/DL (ref 70–110)
HCT VFR BLD AUTO: 29.4 % (ref 39.6–50)
HGB BLD-MCNC: 9.5 G/DL (ref 13–17)
LYMPHOCYTES # SPEC AUTO: 0.71 X 10*3/UL (ref 0.9–5)
LYMPHOCYTES NFR SPEC AUTO: 5.4 %
MAGNESIUM SPEC-SCNC: 1.5 MG/DL (ref 1.5–2.4)
MCH RBC QN AUTO: 27 PG (ref 27–32)
MCHC RBC AUTO-ENTMCNC: 32.3 G/DL (ref 32–37)
MCV RBC AUTO: 83.5 FL (ref 80–97)
MONOCYTES # BLD AUTO: 0.41 X 10*3/UL (ref 0.2–1)
MONOCYTES NFR BLD AUTO: 3.1 %
NEUTROPHILS # BLD AUTO: 11.77 X 10*3/UL (ref 1.8–7.7)
NEUTROPHILS NFR BLD AUTO: 90.3 %
PLATELET # BLD AUTO: 477 X 10*3/UL (ref 140–440)
POTASSIUM SERPL-SCNC: 4.4 MMOL/L (ref 3.5–5.5)
SODIUM SERPL-SCNC: 140 MMOL/L (ref 135–145)
WBC # BLD AUTO: 13.05 X 10*3/UL (ref 4.5–10)

## 2021-02-23 PROCEDURE — 0D9670Z DRAINAGE OF STOMACH WITH DRAINAGE DEVICE, VIA NATURAL OR ARTIFICIAL OPENING: ICD-10-PCS

## 2021-02-23 RX ADMIN — IOPAMIDOL PRN ML: 612 INJECTION, SOLUTION INTRAVENOUS at 09:50

## 2021-02-23 RX ADMIN — DEXAMETHASONE SODIUM PHOSPHATE SCH MG: 4 INJECTION, SOLUTION INTRAMUSCULAR; INTRAVENOUS at 23:41

## 2021-02-23 RX ADMIN — DEXAMETHASONE SODIUM PHOSPHATE SCH: 4 INJECTION, SOLUTION INTRAMUSCULAR; INTRAVENOUS at 16:43

## 2021-02-23 RX ADMIN — METOCLOPRAMIDE SCH MG: 5 INJECTION, SOLUTION INTRAMUSCULAR; INTRAVENOUS at 19:37

## 2021-02-23 RX ADMIN — ACETAMINOPHEN SCH: 500 TABLET ORAL at 23:37

## 2021-02-23 RX ADMIN — METOCLOPRAMIDE SCH MG: 5 INJECTION, SOLUTION INTRAMUSCULAR; INTRAVENOUS at 01:52

## 2021-02-23 RX ADMIN — MAGNESIUM SULFATE IN DEXTROSE SCH MLS/HR: 10 INJECTION, SOLUTION INTRAVENOUS at 16:46

## 2021-02-23 RX ADMIN — ONDANSETRON SCH MG: 2 INJECTION INTRAMUSCULAR; INTRAVENOUS at 23:41

## 2021-02-23 RX ADMIN — CEFAZOLIN SCH: 330 INJECTION, POWDER, FOR SOLUTION INTRAMUSCULAR; INTRAVENOUS at 01:36

## 2021-02-23 RX ADMIN — IOPAMIDOL PRN ML: 612 INJECTION, SOLUTION INTRAVENOUS at 10:45

## 2021-02-23 RX ADMIN — PIPERACILLIN AND TAZOBACTAM SCH MLS/HR: 3; .375 INJECTION, POWDER, FOR SOLUTION INTRAVENOUS at 05:25

## 2021-02-23 RX ADMIN — GABAPENTIN SCH: 300 CAPSULE ORAL at 06:50

## 2021-02-23 RX ADMIN — CEFAZOLIN SCH MLS/HR: 330 INJECTION, POWDER, FOR SOLUTION INTRAMUSCULAR; INTRAVENOUS at 16:48

## 2021-02-23 RX ADMIN — PANTOPRAZOLE SODIUM SCH MG: 40 INJECTION, POWDER, FOR SOLUTION INTRAVENOUS at 09:51

## 2021-02-23 RX ADMIN — ONDANSETRON SCH MG: 2 INJECTION INTRAMUSCULAR; INTRAVENOUS at 05:25

## 2021-02-23 RX ADMIN — ONDANSETRON SCH: 2 INJECTION INTRAMUSCULAR; INTRAVENOUS at 16:43

## 2021-02-23 RX ADMIN — METOCLOPRAMIDE SCH: 5 INJECTION, SOLUTION INTRAMUSCULAR; INTRAVENOUS at 16:42

## 2021-02-23 RX ADMIN — ENOXAPARIN SODIUM SCH MG: 30 INJECTION SUBCUTANEOUS at 09:51

## 2021-02-23 RX ADMIN — TAMSULOSIN HYDROCHLORIDE SCH MG: 0.4 CAPSULE ORAL at 09:51

## 2021-02-23 RX ADMIN — ACETAMINOPHEN SCH: 500 TABLET ORAL at 06:50

## 2021-02-23 RX ADMIN — METOCLOPRAMIDE SCH MG: 5 INJECTION, SOLUTION INTRAMUSCULAR; INTRAVENOUS at 09:51

## 2021-02-23 RX ADMIN — MAGNESIUM SULFATE IN DEXTROSE SCH MLS/HR: 10 INJECTION, SOLUTION INTRAVENOUS at 19:37

## 2021-02-23 RX ADMIN — PIPERACILLIN AND TAZOBACTAM SCH MLS/HR: 3; .375 INJECTION, POWDER, FOR SOLUTION INTRAVENOUS at 19:37

## 2021-02-23 RX ADMIN — PIPERACILLIN AND TAZOBACTAM SCH MLS/HR: 3; .375 INJECTION, POWDER, FOR SOLUTION INTRAVENOUS at 12:23

## 2021-02-23 RX ADMIN — CEFAZOLIN SCH MLS/HR: 330 INJECTION, POWDER, FOR SOLUTION INTRAMUSCULAR; INTRAVENOUS at 01:51

## 2021-02-23 RX ADMIN — DEXAMETHASONE SODIUM PHOSPHATE SCH MG: 4 INJECTION, SOLUTION INTRAMUSCULAR; INTRAVENOUS at 12:23

## 2021-02-23 RX ADMIN — DEXAMETHASONE SODIUM PHOSPHATE SCH MG: 4 INJECTION, SOLUTION INTRAMUSCULAR; INTRAVENOUS at 05:25

## 2021-02-23 RX ADMIN — ACETAMINOPHEN SCH: 500 TABLET ORAL at 01:37

## 2021-02-23 RX ADMIN — MAGNESIUM SULFATE IN DEXTROSE SCH MLS/HR: 10 INJECTION, SOLUTION INTRAVENOUS at 12:23

## 2021-02-23 RX ADMIN — ACETAMINOPHEN SCH: 500 TABLET ORAL at 16:43

## 2021-02-23 RX ADMIN — ONDANSETRON SCH: 2 INJECTION INTRAMUSCULAR; INTRAVENOUS at 01:37

## 2021-02-23 RX ADMIN — MAGNESIUM SULFATE IN DEXTROSE SCH MLS/HR: 10 INJECTION, SOLUTION INTRAVENOUS at 17:50

## 2021-02-23 RX ADMIN — ONDANSETRON SCH MG: 2 INJECTION INTRAMUSCULAR; INTRAVENOUS at 12:23

## 2021-02-23 NOTE — P.PN
<Lucretia Abraham - Last Filed: 02/23/21 13:39>





Subjective


Progress Note Date: 02/23/21





CHIEF COMPLAINT: Colocutaneous fistula complicated abdominal wall abscess





HISTORY OF PRESENT ILLNESS:  The patient is a 60-year-old male who presented 

with sepsis including colocutaneous posterior with history of Crohn's disease.  

He status post open takedown of colocutaneous fistula with Trujillo's procedure 

and drainage of left lower quadrant abdominal wall abscess, 02/19/2021.  Patient

is sitting up at bedside chair.  Epidural removed this morning.  He reports his 

pain is controlled.  Denies any nausea or vomiting.  He has gas in the ostomy 

bag.  No stool.  Afebrile.  WBC up at 13.05.  Hgb 9.5 creatinine 0.6 magnesium 

1.5





Computed tomography scan of the abdomen and pelvis there are dilated loops of 

small bowel and Contras is not coursed distally, clear transition point is not 

identified with certain need.  Findings may represent postoperative ileus.  

Postoperative findings there is anasarca, minimal pneumoperitoneum, subcutaneous

emphysema.  Interval development of bilateral pleural effusions and basilar 

atelectasis.  CAT scan reviewed by Dr. Morrissey





PHYSICAL EXAM: 


VITAL SIGNS: Reviewed


GENERAL: Well-developed in no acute distress. 


HEENT:  No sclera icterus. Extraocular movements grossly intact.  Moist buccal 

mucosa. 


Head is atraumatic, normocephalic. Hears conversational speech. No nasal 

drainage.


NECK:  Supple without lymphadenopathy.


CHEST:  Non-labored respirations and equal bilateral excursions. 


CARDIOVASCULAR: Palpable 2+ radial pulses.


ABDOMEN:  Soft.  Nondistended.  Dressing clean dry and intact. Resolved left 

lower quadrant erythema including fluctuance. 


MUSCULOSKELETAL:  No clubbing or cyanosis.


NEUROLOGIC:  No focal or lateralizing signs.  Cranial nerves II through XII 

grossly intact.


PSYCH:  Appropriate affect.  Alert and oriented to person, place and time.


SKIN: Well perfused.  Good skin turgor. 





ASSESSMENT: 


1.  Sepsis with colocutaneous fistula and abdominal wall abscess


2.  Crohn's, complicated


3.  Lack of medical care


4.  Family history of cardiovascular disease including a stroke


5.  Iron deficiency anemia


6.  Gastroesophageal reflux disease  


7.  Postoperative ileus


8.  Hypomagnesemia





PLAN: 


-NG tube placed for decompression due to postoperative ileus


-Replace magnesium


-Epidural and Vigil catheter discontinued today


-Continue IV antibiotics


-Continue pain medication as needed


-Continue antiemetics


-Encouraged patient to increase activity and to use incentive spirometer


-GI prophylaxis Protonix and DVT prophylaxis Lovenox





Physician Assistant note has been reviewed by physician. Signing provider agrees

with the documented findings, assessment, and plan of care. 





Objective





- Vital Signs


Vital signs: 


                                   Vital Signs











Temp  97.8 F   02/23/21 07:22


 


Pulse  68   02/23/21 08:15


 


Resp  18   02/23/21 08:15


 


BP  140/69   02/23/21 07:22


 


Pulse Ox  90 L  02/23/21 07:22








                                 Intake & Output











 02/22/21 02/23/21 02/23/21





 18:59 06:59 18:59


 


Intake Total 129.167  


 


Output Total 400  


 


Balance -270.833  


 


Weight 75.75 kg  


 


Intake:   


 


  Intake, IV Titration 129.167  





  Amount   


 


    Ropivacaine 250 mg 129.167  





    Hydromorphone (Pf) 5 mg   





    In Sodium Chloride 0.9%   





    200 ml @ Per Protocol   





    EPIDURAL .Q0M PRN Rx#:   





    [id number]   


 


Output:   


 


  Urine 400  


 


Other:   


 


  Voiding Method Indwelling Catheter Indwelling Catheter Indwelling Catheter


 


  # Voids  750 














- Labs


CBC & Chem 7: 


                                 02/23/21 05:35





                                 02/23/21 05:35


Labs: 


                  Abnormal Lab Results - Last 24 Hours (Table)











  02/23/21 02/23/21 Range/Units





  05:35 05:35 


 


WBC  13.05 H   (4.50-10.00)  X 10*3/uL


 


RBC  3.52 L   (4.40-5.60)  X 10*6/uL


 


Hgb  9.5 L   (13.0-17.0)  g/dL


 


Hct  29.4 L   (39.6-50.0)  %


 


Plt Count  477 H   (140-440)  X 10*3/uL


 


MPV  9.3 L   (9.5-12.2)  fL


 


Immature Gran #  0.12 H   (0.00-0.04)  X 10*3/uL


 


Neutrophils #  11.77 H   (1.80-7.70)  X 10*3/uL


 


Lymphocytes #  0.71 L   (0.90-5.00)  X 10*3/uL


 


Eosinophils #  0.01 L   (0.04-0.35)  X 10*3/uL


 


BUN/Creatinine Ratio   25.00 H  (12.00-20.00)  Ratio


 


Calcium   7.7 L  (8.7-10.3)  mg/dL








                      Microbiology - Last 24 Hours (Table)











 02/17/21 21:20 Blood Culture - Preliminary





 Blood    No Growth after 120 hours


 


 02/19/21 15:00 Anaerobic Culture - Final





 Abdomen    Anaerobic Gm Negative Bacilli














<Lexi Morrissey N - Last Filed: 02/24/21 09:15>





Subjective





Please see additional documentation below including correction.








HISTORY OF PRESENT ILLNESS:  The patient is a 60-year-old male who presented 

with sepsis including colocutaneous fistula with history of Crohn's disease.  

Per discussion with nursing, ostomy had moderate flatus. 





STUDIES: CT of the abdomen and pelvis and postoperative without evidence of 

intra-abdominal abscess.  Findings consistent with ileus.  This my independent 

interpretation.








ASSESSMENT: 


1.  Sepsis with colocutaneous fistula and abdominal wall abscess


2.  Crohn's, complicated


3.  Lack of medical care


4.  Family history of cardiovascular disease including a stroke


5.  Iron deficiency anemia


6.  Gastroesophageal reflux disease  


7.  EXPECTED ileus, multifactorial


8.  Hypomagnesemia





PLAN: 


1.  Patient is not ambulating that also contributes to ileus.  Patient has been 

noncompliant with this respect


2.  Correction of hypomagnesia which also contributes to ileus


3.  Bowel rest for today.





Objective





- Vital Signs


Vital signs: 


                                   Vital Signs











Temp  98.1 F   02/24/21 08:00


 


Pulse  43 L  02/24/21 08:00


 


Resp  17   02/24/21 08:00


 


BP  129/64   02/24/21 08:00


 


Pulse Ox  94 L  02/24/21 08:00








                                 Intake & Output











 02/23/21 02/24/21 02/24/21





 18:59 06:59 18:59


 


Intake Total 965 600 


 


Output Total 775 800 700


 


Balance 190 -200 -700


 


Intake:   


 


  Intake, IV Titration 865 600 





  Amount   


 


    Magnesium Sulfate-D5w Pmx 300 100 





    1 gm In Dextrose/Water 1   





    100ml.bag @ 100 mls/hr   





    IVPB Q1H [facility] Rx#:   





    [id number]   


 


    Piperacillin-Tazobactam 3 100  





    .375 gm In Sodium   





    Chloride 0.9% 100 ml @ 25   





    mls/hr IVPB Q8H [facility] Rx#:   





    [id number]   


 


    Sodium Chloride 0.9% 1, 240  





    000 ml @ 120 mls/hr IV .   





    Q8H20M [facility] Rx#:[id number]   


 


    Sodium Chloride 0.9% 1, 225 500 





    000 ml @ 75 mls/hr IV .   





    [id number] [facility] Rx#:[id number]   


 


  Oral 100  


 


Output:   


 


  Gastric Drainage 775  700


 


  Urine  800 


 


Other:   


 


  Voiding Method Indwelling Catheter Indwelling Catheter 


 


  # Voids 3  














- Labs


CBC & Chem 7: 


                                 02/23/21 05:35





                                 02/23/21 05:35


Labs: 


                  Abnormal Lab Results - Last 24 Hours (Table)











  02/23/21 Range/Units





  05:35 


 


BUN/Creatinine Ratio  25.00 H  (12.00-20.00)  Ratio


 


Calcium  7.7 L  (8.7-10.3)  mg/dL








                      Microbiology - Last 24 Hours (Table)











 02/17/21 21:20 Blood Culture - Final





 Blood    No Growth after 144 hours














Assessment and Plan


(1) Left lower quadrant pain


Current Visit: Yes   Status: Acute   Code(s): R10.32 - LEFT LOWER QUADRANT PAIN 

 SNOMED Code(s): 967933830


   





(2) Crohn's disease


Current Visit: Yes   Status: Acute   Code(s): K50.90 - CROHN'S DISEASE, 

UNSPECIFIED, WITHOUT COMPLICATIONS   SNOMED Code(s): 55900701


   





(3) Leukocytosis


Current Visit: Yes   Status: Acute   Code(s): D72.829 - ELEVATED WHITE BLOOD 

CELL COUNT, UNSPECIFIED   SNOMED Code(s): 840118374


   





(4) Anemia, iron deficiency


Current Visit: Yes   Status: Acute   Code(s): D50.9 - IRON DEFICIENCY ANEMIA, 

UNSPECIFIED   SNOMED Code(s): 66836047


   





(5) Sepsis


Current Visit: Yes   Status: Acute   Code(s): A41.9 - SEPSIS, UNSPECIFIED 

ORGANISM   SNOMED Code(s): 50393746


   





(6) Colocutaneous fistula


Current Visit: Yes   Status: Acute   Code(s): K63.2 - FISTULA OF INTESTINE   

SNOMED Code(s): 143601262

## 2021-02-23 NOTE — CT
EXAMINATION TYPE: CT abdomen pelvis w con

 

DATE OF EXAM: 2/23/2021

 

COMPARISON:

 

HISTORY: colocutaneous fistula, leukocytosis

 

CT DLP: 961 mGycm

Automated exposure control for dose reduction was used.

 

TECHNIQUE:  Helical acquisition of images from the lung bases through the pelvis have been completed.


 

CONTRAST: 

Performed with Oral Contrast and with IV Contrast, patient injected with 100 mL of Isovue 300.

 

FINDINGS: Minimal free air is present noted beneath the hemidiaphragm. There is some anasarca changes
. Surgical staples present along the midline.

 

LUNG BASES: There are bibasilar effusions with associated probable atelectatic changes, difficult to 
exclude pneumonia.

 

AORTA:  No significant abnormality is appreciated.

 

LIVER/GB: No significant abnormality is appreciated.

 

PANCREAS: No significant abnormality is seen.

 

SPLEEN: No significant abnormality is seen.

 

ADRENALS: No significant abnormality is seen.

 

KIDNEYS: No significant abnormality is seen.

 

 

REPRODUCTIVE ORGANS: Suspect there are hydroceles present. Surgical clips noted in the left lower maury
drant, interval improvement in the subcutaneous abscess

 

BOWEL:  Interval ostomy has been placed in the left lower quadrant. Contrast material has not coursed
 entirely through the bowel is present within the stomach and proximal small bowel, fluid-filled loop
s of small bowel are present. Sigmoid colon shows the postop change with the version and Trujillo's po
uch with some local bowel wall thickening possibly postoperative edema. Some subcutaneous emphysema c
hanges are present. There are some distended loops of small bowel present some of which contain air-c
ontrast levels some or fluid-filled.

 

 FREE AIR:  Minimal free air is present but likely due to postop change.

 

ASCITES:  Minimal free fluid present

 

PELVIC ADENOPATHY:  None visualized.

 

RETROPERITONEAL ADENOPATHY:  No Retroperitoneal Adenopathy visible.

 

URINARY BLADDER:  Vigil catheter is in place, air is present within the bladder

 

OSSEOUS STRUCTURES:  No significant abnormality is seen.

 

IMPRESSION: 

THERE ARE DILATED LOOPS OF SMALL BOWEL AND CONTRAST HAS NOT COURSED DISTALLY, CLEAR TRANSITION POINT 
IS NOT IDENTIFIED WITH CERTAINTY, FINDINGS MAY REPRESENT POSTOPERATIVE ILEUS, CORRELATE FOR OBSTRUCTI
ON AND CONSIDER FOLLOW-UP TO ASSESS FOR TRANSIT OF PATIENT'S CONTRAST MATERIAL WITHIN THE SMALL BOWEL
 AND TO THE LEVEL OF THE COLON AND OSTOMY. POSTOP FINDINGS, THERE IS ANASARCA, MINIMAL PNEUMOPERITONE
UM, SUBCUTANEOUS EMPHYSEMA. INTERVAL DEVELOPMENT OF BILATERAL PLEURAL EFFUSIONS AND BASILAR ATELECTAS
IS, CORRELATE TO EXCLUDE PNEUMONIA. Additional findings above.

## 2021-02-23 NOTE — P.PN
Progress Note - Text


Progress Note Date: 02/23/21





POD 4 from colostomy.  Patient is doing very well.  He will have his catheter 

removed today.  He has been ambulating without assistance to the chair.  There 

is no lower extremity weakness.  The plan is to remove the epidural catheter 

today.

## 2021-02-23 NOTE — P.PN
Subjective


Progress Note Date: 02/23/21





Patient was seen and evaluated this morning.  He denies any abdominal pain.  No 

nausea or vomiting.  He was able to tolerate o oral contrast for computed 

tomography scan of the abdomen scheduled later today.





Objective





- Vital Signs


Vital signs: 


                                   Vital Signs











Temp  97.8 F   02/23/21 07:22


 


Pulse  68   02/23/21 08:15


 


Resp  18   02/23/21 08:15


 


BP  140/69   02/23/21 07:22


 


Pulse Ox  90 L  02/23/21 07:22








                                 Intake & Output











 02/22/21 02/23/21 02/23/21





 18:59 06:59 18:59


 


Intake Total 129.167  


 


Output Total 400  


 


Balance -270.833  


 


Weight 75.75 kg  


 


Intake:   


 


  Intake, IV Titration 129.167  





  Amount   


 


    Ropivacaine 250 mg 129.167  





    Hydromorphone (Pf) 5 mg   





    In Sodium Chloride 0.9%   





    200 ml @ Per Protocol   





    EPIDURAL .Q0M PRN Rx#:   





    [id number]   


 


Output:   


 


  Urine 400  


 


Other:   


 


  Voiding Method Indwelling Catheter Indwelling Catheter Indwelling Catheter


 


  # Voids  750 














- Exam





General:  The patient is awake and alert, in no distress


Eye: there is normal conjunctiva bilaterally.  


Neck:  The neck is supple, there is no  JVD.  


Cardiovascular:  Normal  S1-S2, no S3-S4, no murmurs.


Respiratory: Lungs clear to auscultation bilaterally


Gastrointestinal: Abdomen is soft, nontender.  Ostomy in place.


Musculoskeletal:  There is no pedal edema.  


Neurological:. Speech is normal. 


Skin:  Skin is warm and dry 





- Labs


CBC & Chem 7: 


                                 02/23/21 05:35





                                 02/23/21 05:35


Labs: 


                  Abnormal Lab Results - Last 24 Hours (Table)











  02/23/21 02/23/21 Range/Units





  05:35 05:35 


 


WBC  13.05 H   (4.50-10.00)  X 10*3/uL


 


RBC  3.52 L   (4.40-5.60)  X 10*6/uL


 


Hgb  9.5 L   (13.0-17.0)  g/dL


 


Hct  29.4 L   (39.6-50.0)  %


 


Plt Count  477 H   (140-440)  X 10*3/uL


 


MPV  9.3 L   (9.5-12.2)  fL


 


Immature Gran #  0.12 H   (0.00-0.04)  X 10*3/uL


 


Neutrophils #  11.77 H   (1.80-7.70)  X 10*3/uL


 


Lymphocytes #  0.71 L   (0.90-5.00)  X 10*3/uL


 


Eosinophils #  0.01 L   (0.04-0.35)  X 10*3/uL


 


BUN/Creatinine Ratio   25.00 H  (12.00-20.00)  Ratio


 


Calcium   7.7 L  (8.7-10.3)  mg/dL








                      Microbiology - Last 24 Hours (Table)











 02/17/21 21:20 Blood Culture - Preliminary





 Blood    No Growth after 120 hours


 


 02/19/21 15:00 Anaerobic Culture - Final





 Abdomen    Anaerobic Gm Negative Bacilli














Assessment and Plan


Assessment: 





Patient is a 61 yo M with a hx of crohns disease initially diagnosed 40 year ago

currently treating with homeopathic options who initially presented to his PCP 

in [place] with left groin pain, they were concerned for hernia that was 

not reducible and sent him to the hospital. He was admitted to surgery and 

underwent CT abdomen and pelvis which showed abscess and fistula tract. On 2/19 

he underwent sigmoid colectomy with takedown of colocutaneous fistula, ostomy, 

and abdominal wall abscess drainage. He tolerated the procedure well. He was 

advanced to a clear liquid   








Sigmoid colitis with fistulas tract secondary to crohn's disease and large 

subcutaneous abscess (gram negative) with sepsis


- 2/19 s/p sigmoid colectoy with takedown of colocutaneous fistula, ostomy, and 

abdominal wall abscess drainage


- surgical recs: wound vac management, full liquids


-Postoperative computed tomography scan of the abdomen showed possible ileus


- Wound culture showed pansensitive susceptible E. coli currently on IV Zosyn 





Iron deficiency anemia





Hypomagnesemia, resolved 








Postoperative management per general surgery

## 2021-02-24 LAB
ANION GAP SERPL CALC-SCNC: 8.4 MMOL/L (ref 4–12)
BASOPHILS # BLD AUTO: 0.03 X 10*3/UL (ref 0–0.1)
BASOPHILS NFR BLD AUTO: 0.2 %
BUN SERPL-SCNC: 18 MG/DL (ref 9–27)
BUN/CREAT SERPL: 30 RATIO (ref 12–20)
CALCIUM SPEC-MCNC: 7.7 MG/DL (ref 8.7–10.3)
CHLORIDE SERPL-SCNC: 104 MMOL/L (ref 96–109)
CO2 SERPL-SCNC: 28.6 MMOL/L (ref 21.6–31.8)
EOSINOPHIL # BLD AUTO: 0.01 X 10*3/UL (ref 0.04–0.35)
EOSINOPHIL NFR BLD AUTO: 0.1 %
ERYTHROCYTE [DISTWIDTH] IN BLOOD BY AUTOMATED COUNT: 3.57 X 10*6/UL (ref 4.4–5.6)
ERYTHROCYTE [DISTWIDTH] IN BLOOD: 14.1 % (ref 11.5–14.5)
GLUCOSE SERPL-MCNC: 108 MG/DL (ref 70–110)
HCT VFR BLD AUTO: 29.8 % (ref 39.6–50)
HGB BLD-MCNC: 9.4 G/DL (ref 13–17)
LYMPHOCYTES # SPEC AUTO: 0.59 X 10*3/UL (ref 0.9–5)
LYMPHOCYTES NFR SPEC AUTO: 4.6 %
MAGNESIUM SPEC-SCNC: 1.9 MG/DL (ref 1.5–2.4)
MCH RBC QN AUTO: 26.3 PG (ref 27–32)
MCHC RBC AUTO-ENTMCNC: 31.5 G/DL (ref 32–37)
MCV RBC AUTO: 83.5 FL (ref 80–97)
MONOCYTES # BLD AUTO: 0.39 X 10*3/UL (ref 0.2–1)
MONOCYTES NFR BLD AUTO: 3.1 %
NEUTROPHILS # BLD AUTO: 11.54 X 10*3/UL (ref 1.8–7.7)
NEUTROPHILS NFR BLD AUTO: 90.9 %
PLATELET # BLD AUTO: 499 X 10*3/UL (ref 140–440)
POTASSIUM SERPL-SCNC: 3.9 MMOL/L (ref 3.5–5.5)
SODIUM SERPL-SCNC: 141 MMOL/L (ref 135–145)
WBC # BLD AUTO: 12.7 X 10*3/UL (ref 4.5–10)

## 2021-02-24 RX ADMIN — METOCLOPRAMIDE SCH MG: 5 INJECTION, SOLUTION INTRAMUSCULAR; INTRAVENOUS at 04:42

## 2021-02-24 RX ADMIN — ACETAMINOPHEN SCH: 500 TABLET ORAL at 18:06

## 2021-02-24 RX ADMIN — PIPERACILLIN AND TAZOBACTAM SCH MLS/HR: 3; .375 INJECTION, POWDER, FOR SOLUTION INTRAVENOUS at 09:49

## 2021-02-24 RX ADMIN — METRONIDAZOLE SCH: 500 INJECTION, SOLUTION INTRAVENOUS at 23:00

## 2021-02-24 RX ADMIN — PIPERACILLIN AND TAZOBACTAM SCH MLS/HR: 3; .375 INJECTION, POWDER, FOR SOLUTION INTRAVENOUS at 04:42

## 2021-02-24 RX ADMIN — DEXAMETHASONE SODIUM PHOSPHATE SCH MG: 4 INJECTION, SOLUTION INTRAMUSCULAR; INTRAVENOUS at 04:43

## 2021-02-24 RX ADMIN — METOCLOPRAMIDE SCH: 5 INJECTION, SOLUTION INTRAMUSCULAR; INTRAVENOUS at 22:27

## 2021-02-24 RX ADMIN — TAMSULOSIN HYDROCHLORIDE SCH MG: 0.4 CAPSULE ORAL at 09:48

## 2021-02-24 RX ADMIN — ONDANSETRON SCH MG: 2 INJECTION INTRAMUSCULAR; INTRAVENOUS at 23:02

## 2021-02-24 RX ADMIN — ONDANSETRON SCH MG: 2 INJECTION INTRAMUSCULAR; INTRAVENOUS at 05:55

## 2021-02-24 RX ADMIN — ONDANSETRON SCH MG: 2 INJECTION INTRAMUSCULAR; INTRAVENOUS at 18:08

## 2021-02-24 RX ADMIN — PIPERACILLIN AND TAZOBACTAM SCH MLS/HR: 3; .375 INJECTION, POWDER, FOR SOLUTION INTRAVENOUS at 22:27

## 2021-02-24 RX ADMIN — DEXAMETHASONE SODIUM PHOSPHATE SCH MG: 4 INJECTION, SOLUTION INTRAMUSCULAR; INTRAVENOUS at 12:00

## 2021-02-24 RX ADMIN — ENOXAPARIN SODIUM SCH MG: 30 INJECTION SUBCUTANEOUS at 09:48

## 2021-02-24 RX ADMIN — METRONIDAZOLE SCH MLS/HR: 500 INJECTION, SOLUTION INTRAVENOUS at 19:10

## 2021-02-24 RX ADMIN — ACETAMINOPHEN SCH MG: 500 TABLET ORAL at 16:00

## 2021-02-24 RX ADMIN — METOCLOPRAMIDE SCH MG: 5 INJECTION, SOLUTION INTRAMUSCULAR; INTRAVENOUS at 09:48

## 2021-02-24 RX ADMIN — ACETAMINOPHEN SCH: 500 TABLET ORAL at 04:43

## 2021-02-24 RX ADMIN — ALVIMOPAN SCH MG: 12 CAPSULE ORAL at 12:00

## 2021-02-24 RX ADMIN — MAGNESIUM SULFATE IN DEXTROSE SCH MLS/HR: 10 INJECTION, SOLUTION INTRAVENOUS at 15:57

## 2021-02-24 RX ADMIN — ONDANSETRON SCH MG: 2 INJECTION INTRAMUSCULAR; INTRAVENOUS at 16:02

## 2021-02-24 RX ADMIN — CEFAZOLIN SCH: 330 INJECTION, POWDER, FOR SOLUTION INTRAMUSCULAR; INTRAVENOUS at 18:05

## 2021-02-24 RX ADMIN — PANTOPRAZOLE SODIUM SCH MG: 40 INJECTION, POWDER, FOR SOLUTION INTRAVENOUS at 09:48

## 2021-02-24 RX ADMIN — MAGNESIUM SULFATE IN DEXTROSE SCH MLS/HR: 10 INJECTION, SOLUTION INTRAVENOUS at 11:15

## 2021-02-24 RX ADMIN — ACETAMINOPHEN SCH MG: 500 TABLET ORAL at 23:02

## 2021-02-24 RX ADMIN — ALVIMOPAN SCH: 12 CAPSULE ORAL at 21:49

## 2021-02-24 RX ADMIN — METOCLOPRAMIDE SCH MG: 5 INJECTION, SOLUTION INTRAMUSCULAR; INTRAVENOUS at 14:00

## 2021-02-24 RX ADMIN — CEFAZOLIN SCH: 330 INJECTION, POWDER, FOR SOLUTION INTRAMUSCULAR; INTRAVENOUS at 03:35

## 2021-02-24 RX ADMIN — THIAMINE HYDROCHLORIDE SCH MLS/HR: 100 INJECTION, SOLUTION INTRAMUSCULAR; INTRAVENOUS at 22:26

## 2021-02-24 RX ADMIN — DEXAMETHASONE SODIUM PHOSPHATE SCH: 4 INJECTION, SOLUTION INTRAMUSCULAR; INTRAVENOUS at 18:09

## 2021-02-24 NOTE — P.PN
Subjective


Progress Note Date: 02/24/21














CHIEF COMPLAINT: Colocutaneous fistula complicated abdominal wall abscess





HISTORY OF PRESENT ILLNESS: The patient is a 60-year-old male who presented with

sepsis including colocutaneous with history of Crohn's disease.  He status post 

open takedown of colocutaneous fistula with Trujillo's procedure and drainage of 

left lower quadrant abdominal wall abscess, 02/19/2021.  Yesterday, CT abdomen 

and pelvis was obtained.  Findings consistent with ileus.  Nasogastric tube was 

placed with over 700 mL removed from overnight.  Patient has minimal ambulation 

despite constant encouragement to ambulate.  Otherwise pain is controlled.  He 

had flatus from his ostomy bag per discussion with the nurse yesterday.





REVIEW OF ORGAN SYSTEMS: No fevers or chills, no chest pain or shortness of 

breath, no productive sputum





PHYSICAL EXAM:


VITALS: Reviewed


CONSTITUTIONAL:  Well developed and in no acute distress. 


EYES:  Conjuctivae without sclera icterus. Extraocular movements grossly intact.




HEAD, EARS, NOSE, THROAT: Moist buccal mucosa. Head is atraumatic, 

normocephalic. Hears conversational speech.  NG tube with bilious succus


RESPIRATORY:  Non-labored respirations and equal bilateral excursions. No gross 

wheezes. 


CARDIOVASCULAR:  Regular rate and rhythm.  


ABDOMEN:  Midline dressing clean dry and intact.  No erythema or cellulitis.  

Ostomy pink and patent.  Minimal flatus.


MUSCULOSKELETAL: Has clubbing of the fingers.  No diffuse lower extremity edema.


SKIN:  Warm and well perfused with good skin turgor.


NEUROLOGIC: Cranial nerves II through XII grossly intact. No focal or 

lateralizing signs. 


PSYCH:  Appropriate affect.  Alert and oriented to person, place and time. 

Displays appropriate insight.


: Vigil present with dark urine.





CLINCAL LABS: WBC yesterday over 13,000.  Magnesium low, less than 1.6 yesterday





STUDIES: CT of the abdomen and pelvis reviewed from yesterday demonstrating 

ileus.  No large intra-abdominal abscesses identified.





ASSESSMENT: 


1.  Sepsis with colocutaneous fistula


2.  Crohn's, complicated


3.  Lack of medical care


4.  Family history of cardiovascular disease including a stroke


5.  Iron deficiency anemia


6.  Gastroesophageal reflux disease


7.  Expected ileus


8.  Hypomagnesia








PLAN: 


1.  Patient was advised and encouraged to ambulate.  Lack of ambulation as a 

risk factor for ileus.


2.  Anticipate discontinue NG tube with functioning ostomy and ambulate


3.  Patient has been nothing by mouth for 48 hours.  Will monitor nutritional 

intake.


4.  May have ice or some popsicles.


5.  Overall, patient compliance needed for optimal recovery including ambulation

and incentive spirometer use





Objective





- Vital Signs


Vital signs: 


                                   Vital Signs











Temp  98.1 F   02/24/21 08:00


 


Pulse  43 L  02/24/21 08:00


 


Resp  17   02/24/21 08:00


 


BP  129/64   02/24/21 08:00


 


Pulse Ox  94 L  02/24/21 08:00








                                 Intake & Output











 02/23/21 02/24/21 02/24/21





 18:59 06:59 18:59


 


Intake Total 965 600 


 


Output Total 775 800 700


 


Balance 190 -200 -700


 


Intake:   


 


  Intake, IV Titration 865 600 





  Amount   


 


    Magnesium Sulfate-D5w Pmx 300 100 





    1 gm In Dextrose/Water 1   





    100ml.bag @ 100 mls/hr   





    IVPB Q1H [facility]O Rx#:   





    [id number]   


 


    Piperacillin-Tazobactam 3 100  





    .375 gm In Sodium   





    Chloride 0.9% 100 ml @ 25   





    mls/hr IVPB Q8H [facility] Rx#:   





    [id number]   


 


    Sodium Chloride 0.9% 1, 240  





    000 ml @ 120 mls/hr IV .   





    Q8H20M [facility] Rx#:[id number]   


 


    Sodium Chloride 0.9% 1, 225 500 





    000 ml @ 75 mls/hr IV .   





    [id number] [facility]O Rx#:[id number]   


 


  Oral 100  


 


Output:   


 


  Gastric Drainage 775  700


 


  Urine  800 


 


Other:   


 


  Voiding Method Indwelling Catheter Indwelling Catheter 


 


  # Voids 3  














- Labs


CBC & Chem 7: 


                                 02/23/21 05:35





                                 02/23/21 05:35


Labs: 


                  Abnormal Lab Results - Last 24 Hours (Table)











  02/23/21 02/23/21 Range/Units





  05:35 05:35 


 


WBC  13.05 H   (4.50-10.00)  X 10*3/uL


 


RBC  3.52 L   (4.40-5.60)  X 10*6/uL


 


Hgb  9.5 L   (13.0-17.0)  g/dL


 


Hct  29.4 L   (39.6-50.0)  %


 


Plt Count  477 H   (140-440)  X 10*3/uL


 


MPV  9.3 L   (9.5-12.2)  fL


 


Immature Gran #  0.12 H   (0.00-0.04)  X 10*3/uL


 


Neutrophils #  11.77 H   (1.80-7.70)  X 10*3/uL


 


Lymphocytes #  0.71 L   (0.90-5.00)  X 10*3/uL


 


Eosinophils #  0.01 L   (0.04-0.35)  X 10*3/uL


 


BUN/Creatinine Ratio   25.00 H  (12.00-20.00)  Ratio


 


Calcium   7.7 L  (8.7-10.3)  mg/dL








                      Microbiology - Last 24 Hours (Table)











 02/17/21 21:20 Blood Culture - Final





 Blood    No Growth after 144 hours














Assessment and Plan


(1) Left lower quadrant pain


Current Visit: Yes   Status: Acute   Code(s): R10.32 - LEFT LOWER QUADRANT PAIN 

 SNOMED Code(s): 152481000


   





(2) Crohn's disease


Current Visit: Yes   Status: Acute   Code(s): K50.90 - CROHN'S DISEASE, 

UNSPECIFIED, WITHOUT COMPLICATIONS   SNOMED Code(s): 71657047


   





(3) Leukocytosis


Current Visit: Yes   Status: Acute   Code(s): D72.829 - ELEVATED WHITE BLOOD C

ELL COUNT, UNSPECIFIED   SNOMED Code(s): 119057306


   





(4) Anemia, iron deficiency


Current Visit: Yes   Status: Acute   Code(s): D50.9 - IRON DEFICIENCY ANEMIA, 

UNSPECIFIED   SNOMED Code(s): 61615734


   





(5) Sepsis


Current Visit: Yes   Status: Acute   Code(s): A41.9 - SEPSIS, UNSPECIFIED 

ORGANISM   SNOMED Code(s): 54943243


   





(6) Colocutaneous fistula


Current Visit: Yes   Status: Acute   Code(s): K63.2 - FISTULA OF INTESTINE   

SNOMED Code(s): 062665306

## 2021-02-24 NOTE — P.PN
Subjective





Patient is doing well today.  He denies any abdominal pain or nausea.  He is 

having some output through the ostomy this morning.  NG tube came out this 

morning as well.





Objective





- Vital Signs


Vital signs: 


                                   Vital Signs











Temp  98.1 F   02/24/21 08:00


 


Pulse  43 L  02/24/21 08:00


 


Resp  17   02/24/21 08:00


 


BP  129/64   02/24/21 08:00


 


Pulse Ox  94 L  02/24/21 08:00








                                 Intake & Output











 02/23/21 02/24/21 02/24/21





 18:59 06:59 18:59


 


Intake Total 965 600 


 


Output Total 775 800 700


 


Balance 190 -200 -700


 


Intake:   


 


  Intake, IV Titration 865 600 





  Amount   


 


    Magnesium Sulfate-D5w Pmx 300 100 





    1 gm In Dextrose/Water 1   





    100ml.bag @ 100 mls/hr   





    IVPB Q1H [facility] Rx#:   





    [id number]   


 


    Piperacillin-Tazobactam 3 100  





    .375 gm In Sodium   





    Chloride 0.9% 100 ml @ 25   





    mls/hr IVPB Q8H [facility] Rx#:   





    [id number]   


 


    Sodium Chloride 0.9% 1, 240  





    000 ml @ 120 mls/hr IV .   





    Q8H20M [facility] Rx#:[id number]   


 


    Sodium Chloride 0.9% 1, 225 500 





    000 ml @ 75 mls/hr IV .   





    [id number] [facility] Rx#:[id number]   


 


  Oral 100  


 


Output:   


 


  Gastric Drainage 775  700


 


  Urine  800 


 


Other:   


 


  Voiding Method Indwelling Catheter Indwelling Catheter 


 


  # Voids 3  














- Exam





General:  The patient is awake and alert, in no distress


Eye: there is normal conjunctiva bilaterally.  


Neck:  The neck is supple, there is no  JVD.  


Cardiovascular:  Normal  S1-S2, no S3-S4, no murmurs.


Respiratory: Lungs clear to auscultation bilaterally


Gastrointestinal: Abdomen is soft, nontender.  Ostomy in place.


Musculoskeletal:  There is no pedal edema.  


Neurological:. Speech is normal. 


Skin:  Skin is warm and dry 





- Labs


CBC & Chem 7: 


                                 02/24/21 06:33





                                 02/24/21 06:33


Labs: 


                  Abnormal Lab Results - Last 24 Hours (Table)











  02/24/21 02/24/21 Range/Units





  06:33 06:33 


 


WBC  12.70 H   (4.50-10.00)  X 10*3/uL


 


RBC  3.57 L   (4.40-5.60)  X 10*6/uL


 


Hgb  9.4 L   (13.0-17.0)  g/dL


 


Hct  29.8 L   (39.6-50.0)  %


 


MCH  26.3 L   (27.0-32.0)  pg


 


MCHC  31.5 L   (32.0-37.0)  g/dL


 


Plt Count  499 H   (140-440)  X 10*3/uL


 


MPV  9.2 L   (9.5-12.2)  fL


 


Immature Gran #  0.14 H   (0.00-0.04)  X 10*3/uL


 


Neutrophils #  11.54 H   (1.80-7.70)  X 10*3/uL


 


Lymphocytes #  0.59 L   (0.90-5.00)  X 10*3/uL


 


Eosinophils #  0.01 L   (0.04-0.35)  X 10*3/uL


 


BUN/Creatinine Ratio   30.00 H  (12.00-20.00)  Ratio


 


Calcium   7.7 L  (8.7-10.3)  mg/dL








                      Microbiology - Last 24 Hours (Table)











 02/17/21 21:20 Blood Culture - Final





 Blood    No Growth after 144 hours














Assessment and Plan


Assessment: 





Patient is a 59 yo M with a hx of crohns disease initially diagnosed 40 year ago

currently treating with homeopathic options who initially presented to his PCP 

in [place] with left groin pain, they were concerned for hernia that was 

not reducible and sent him to the hospital. He was admitted to surgery and 

underwent CT abdomen and pelvis which showed abscess and fistula tract. On 2/19 

he underwent sigmoid colectomy with takedown of colocutaneous fistula, ostomy, 

and abdominal wall abscess drainage. He tolerated the procedure well. He was 

advanced to a clear liquid   





Sigmoid colitis with fistulas tract secondary to crohn's disease and large 

subcutaneous abscess (gram negative) with sepsis


- 2/19 s/p sigmoid colectoy with takedown of colocutaneous fistula, ostomy, and 

abdominal wall abscess drainage


- surgical recs: wound vac management, full liquids


-Postoperative computed tomography scan of the abdomen showed possible ileus, 

improving clinically


- Wound culture showed pansensitive susceptible E. coli currently on IV Zosyn 

day #7





Iron deficiency anemia





Hypomagnesemia, resolved 








Postoperative management per general surgery

## 2021-02-25 VITALS
TEMPERATURE: 98 F | RESPIRATION RATE: 16 BRPM | SYSTOLIC BLOOD PRESSURE: 120 MMHG | HEART RATE: 56 BPM | DIASTOLIC BLOOD PRESSURE: 78 MMHG

## 2021-02-25 LAB
ANION GAP SERPL CALC-SCNC: 6.9 MMOL/L (ref 4–12)
BASOPHILS # BLD AUTO: 0.03 X 10*3/UL (ref 0–0.1)
BASOPHILS NFR BLD AUTO: 0.3 %
BUN SERPL-SCNC: 18 MG/DL (ref 9–27)
BUN/CREAT SERPL: 30 RATIO (ref 12–20)
CALCIUM SPEC-MCNC: 7.5 MG/DL (ref 8.7–10.3)
CHLORIDE SERPL-SCNC: 102 MMOL/L (ref 96–109)
CO2 SERPL-SCNC: 30.1 MMOL/L (ref 21.6–31.8)
EOSINOPHIL # BLD AUTO: 0.16 X 10*3/UL (ref 0.04–0.35)
EOSINOPHIL NFR BLD AUTO: 1.8 %
ERYTHROCYTE [DISTWIDTH] IN BLOOD BY AUTOMATED COUNT: 3.26 X 10*6/UL (ref 4.4–5.6)
ERYTHROCYTE [DISTWIDTH] IN BLOOD: 14.3 % (ref 11.5–14.5)
GLUCOSE SERPL-MCNC: 85 MG/DL (ref 70–110)
HCT VFR BLD AUTO: 27.4 % (ref 39.6–50)
HGB BLD-MCNC: 8.7 G/DL (ref 13–17)
LYMPHOCYTES # SPEC AUTO: 1.21 X 10*3/UL (ref 0.9–5)
LYMPHOCYTES NFR SPEC AUTO: 13.5 %
MCH RBC QN AUTO: 26.7 PG (ref 27–32)
MCHC RBC AUTO-ENTMCNC: 31.8 G/DL (ref 32–37)
MCV RBC AUTO: 84 FL (ref 80–97)
MONOCYTES # BLD AUTO: 0.66 X 10*3/UL (ref 0.2–1)
MONOCYTES NFR BLD AUTO: 7.4 %
NEUTROPHILS # BLD AUTO: 6.68 X 10*3/UL (ref 1.8–7.7)
NEUTROPHILS NFR BLD AUTO: 74.9 %
PLATELET # BLD AUTO: 460 X 10*3/UL (ref 140–440)
POTASSIUM SERPL-SCNC: 3.8 MMOL/L (ref 3.5–5.5)
SODIUM SERPL-SCNC: 139 MMOL/L (ref 135–145)
WBC # BLD AUTO: 8.93 X 10*3/UL (ref 4.5–10)

## 2021-02-25 RX ADMIN — ACETAMINOPHEN SCH: 500 TABLET ORAL at 16:56

## 2021-02-25 RX ADMIN — ACETAMINOPHEN SCH MG: 500 TABLET ORAL at 12:57

## 2021-02-25 RX ADMIN — PIPERACILLIN AND TAZOBACTAM SCH MLS/HR: 3; .375 INJECTION, POWDER, FOR SOLUTION INTRAVENOUS at 03:57

## 2021-02-25 RX ADMIN — THIAMINE HYDROCHLORIDE SCH: 100 INJECTION, SOLUTION INTRAMUSCULAR; INTRAVENOUS at 13:08

## 2021-02-25 RX ADMIN — METRONIDAZOLE SCH MLS/HR: 500 INJECTION, SOLUTION INTRAVENOUS at 12:59

## 2021-02-25 RX ADMIN — METRONIDAZOLE SCH: 500 INJECTION, SOLUTION INTRAVENOUS at 16:56

## 2021-02-25 RX ADMIN — PIPERACILLIN AND TAZOBACTAM SCH: 3; .375 INJECTION, POWDER, FOR SOLUTION INTRAVENOUS at 13:08

## 2021-02-25 RX ADMIN — METRONIDAZOLE SCH MLS/HR: 500 INJECTION, SOLUTION INTRAVENOUS at 05:42

## 2021-02-25 RX ADMIN — ALVIMOPAN SCH MG: 12 CAPSULE ORAL at 08:22

## 2021-02-25 RX ADMIN — ACETAMINOPHEN SCH: 500 TABLET ORAL at 05:44

## 2021-02-25 RX ADMIN — ENOXAPARIN SODIUM SCH MG: 30 INJECTION SUBCUTANEOUS at 08:22

## 2021-02-25 RX ADMIN — ONDANSETRON SCH: 2 INJECTION INTRAMUSCULAR; INTRAVENOUS at 05:44

## 2021-02-25 RX ADMIN — METOCLOPRAMIDE SCH: 5 INJECTION, SOLUTION INTRAMUSCULAR; INTRAVENOUS at 01:54

## 2021-02-25 NOTE — P.PN
Subjective


Progress Note Date: 02/25/21





HISTORY OF PRESENT ILLNESS


This is a 60-year-old male patient treated for abdominal abscess status post la

parotomy and drainage of abscess and diverting colostomy.  Abdominal culture was

positive for E. coli and anaerobes.  Patient states he is eating and drinking 

adequately.  He denies any abdominal pain, nausea or vomiting.  He denies any 

cough, shortness of breath.  No fever or chills.  He is using incentive 

spirometry.  She has been afebrile.  Leukocytosis resolved at 8.9.  Creatinine 

0.6.  Patient is scheduled for discharge today.





   


PHYSICAL EXAMINATION


Gen: This is a 60-year-old  male.  Patient is resting in bed and 

appears to be comfortable and in no acute distress.


HEENT: Head is atraumatic, normocephalic. Pupils equal, round. Sclerae is 

anicteric. 


NECK: Supple. No JVD. 


LUNGS: Clear to auscultation. No wheezes or rhonchi.  No intercostal 

retractions.


HEART: Regular rate and rhythm.  


ABDOMEN: Soft.  No tenderness.


EXTREMITIES: No pedal edema.  


NEUROLOGICAL: Patient is awake, alert and oriented x3. 








ASSESSMENT


Abdominal abscess status post laparotomy and drainage of the abscess and 

diverting colostomy


Wound culture positive for E. coli and anaerobes








PLAN


Recommend ciprofloxacin and Flagyl for 10 day course, prescription sent to 

patient's pharmacy


Patient is cleared for discharge from infectious disease.








The above dictated assessment and findings were discussed with Dr. Maynard.  The 

impression and plan of care have been directed as dictated.  Maxine Boogie nurse 

practitioner acting as scribe for Dr. Maynard.








Objective





- Vital Signs


Vital signs: 


                                   Vital Signs











Temp  98.2 F   02/25/21 08:00


 


Pulse  49 L  02/25/21 08:00


 


Resp  17   02/25/21 08:00


 


BP  117/69   02/25/21 08:00


 


Pulse Ox  95   02/25/21 08:00








                                 Intake & Output











 02/24/21 02/25/21 02/25/21





 18:59 06:59 18:59


 


Intake Total  250 


 


Output Total 1450 1100 350


 


Balance -1450 -850 -350


 


Weight 75.75 kg  


 


Intake:   


 


  Oral  250 


 


Output:   


 


  Gastric Drainage 700  


 


  Urine 750 500 


 


  Stool  600 350


 


Other:   


 


  # Voids 3 1 














- Labs


CBC & Chem 7: 


                                 02/25/21 06:12





                                 02/25/21 06:12


Labs: 


                  Abnormal Lab Results - Last 24 Hours (Table)











  02/25/21 02/25/21 Range/Units





  06:12 06:12 


 


RBC  3.26 L   (4.40-5.60)  X 10*6/uL


 


Hgb  8.7 L   (13.0-17.0)  g/dL


 


Hct  27.4 L   (39.6-50.0)  %


 


MCH  26.7 L   (27.0-32.0)  pg


 


MCHC  31.8 L   (32.0-37.0)  g/dL


 


Plt Count  460 H   (140-440)  X 10*3/uL


 


MPV  9.1 L   (9.5-12.2)  fL


 


Immature Gran #  0.19 H   (0.00-0.04)  X 10*3/uL


 


BUN/Creatinine Ratio   30.00 H  (12.00-20.00)  Ratio


 


Calcium   7.5 L  (8.7-10.3)  mg/dL

## 2021-02-25 NOTE — P.PN
Subjective


Progress Note Date: 02/25/21





Patient is doing well today.  He is looking forward to be discharged home.  No 

acute events overnight.





Objective





- Vital Signs


Vital signs: 


                                   Vital Signs











Temp  98.2 F   02/25/21 08:00


 


Pulse  49 L  02/25/21 08:00


 


Resp  17   02/25/21 08:00


 


BP  117/69   02/25/21 08:00


 


Pulse Ox  95   02/25/21 08:00








                                 Intake & Output











 02/24/21 02/25/21 02/25/21





 18:59 06:59 18:59


 


Intake Total  250 240


 


Output Total 1450 1100 350


 


Balance -1450 -850 -110


 


Weight 75.75 kg  


 


Intake:   


 


  Oral  250 240


 


Output:   


 


  Gastric Drainage 700  


 


  Urine 750 500 


 


  Stool  600 350


 


Other:   


 


  # Voids 3 1 














- Exam





General:  The patient is awake and alert, in no distress


Eye: there is normal conjunctiva bilaterally.  


Neck:  The neck is supple, there is no  JVD.  


Cardiovascular:  Normal  S1-S2, no S3-S4, no murmurs.


Respiratory: Lungs clear to auscultation bilaterally


Gastrointestinal: Abdomen is soft, nontender.  Ostomy in place.


Musculoskeletal:  There is no pedal edema.  


Neurological:. Speech is normal. 


Skin:  Skin is warm and dry 





- Labs


CBC & Chem 7: 


                                 02/25/21 06:12





                                 02/25/21 06:12


Labs: 


                  Abnormal Lab Results - Last 24 Hours (Table)











  02/25/21 02/25/21 Range/Units





  06:12 06:12 


 


RBC  3.26 L   (4.40-5.60)  X 10*6/uL


 


Hgb  8.7 L   (13.0-17.0)  g/dL


 


Hct  27.4 L   (39.6-50.0)  %


 


MCH  26.7 L   (27.0-32.0)  pg


 


MCHC  31.8 L   (32.0-37.0)  g/dL


 


Plt Count  460 H   (140-440)  X 10*3/uL


 


MPV  9.1 L   (9.5-12.2)  fL


 


Immature Gran #  0.19 H   (0.00-0.04)  X 10*3/uL


 


BUN/Creatinine Ratio   30.00 H  (12.00-20.00)  Ratio


 


Calcium   7.5 L  (8.7-10.3)  mg/dL














Assessment and Plan


Assessment: 





Patient is a 59 yo M with a hx of crohns disease initially diagnosed 40 year ago

currently treating with homeopathic options who initially presented to his PCP 

in [place] with left groin pain, they were concerned for hernia that was 

not reducible and sent him to the hospital. He was admitted to surgery and 

underwent CT abdomen and pelvis which showed abscess and fistula tract. On 2/19 

he underwent sigmoid colectomy with takedown of colocutaneous fistula, ostomy, 

and abdominal wall abscess drainage. He tolerated the procedure well. He was 

advanced to a clear liquid   





Sigmoid colitis with fistulas tract secondary to crohn's disease and large 

subcutaneous abscess (gram negative) with sepsis


- 2/19 s/p sigmoid colectoy with takedown of colocutaneous fistula, ostomy, and 

abdominal wall abscess drainage


- surgical recs: wound vac management, full liquids


-Postoperative computed tomography scan of the abdomen showed possible ileus, 

improving clinically


- Wound culture showed pansensitive susceptible E. coli currently on IV Zosyn 

day #7





Iron deficiency anemia





Hypomagnesemia, resolved 








Discharge planning by primary team today.

## 2021-02-25 NOTE — P.PN
Subjective


Progress Note Date: 02/25/21








CHIEF COMPLAINT: Colocutaneous fistula complicated abdominal wall abscess with 

Crohn's disease





HISTORY OF PRESENT ILLNESS: The patient is a 60-year-old male who presented with

sepsis including colocutaneous with complications from Crohn's disease.  He 

status post open takedown of colocutaneous fistula with Trujillo's procedure and 

drainage of left lower quadrant abdominal wall abscess, 02/19/2021.  He is 

postoperative day 6. He is ambulating frequently.  NGT was discontinued 

yesterday. His ostomy is putting out moderate stool.  He feels great.  He 

tolerated full liquid diet including oatmeal this morning.  He has new energy. 





REVIEW OF ORGAN SYSTEMS: No fevers or chills, no chest pain or shortness of 

breath, no productive sputum





PHYSICAL EXAM:


VITALS: Reviewed


CONSTITUTIONAL:  Well developed and in no acute distress. 


EYES:  Conjuctivae without sclera icterus. Extraocular movements grossly intact.




HEAD, EARS, NOSE, THROAT: Moist buccal mucosa. Head is atraumatic, 

normocephalic. Hears conversational speech.  


RESPIRATORY:  Non-labored respirations and equal bilateral excursions. No gross 

wheezes. 


CARDIOVASCULAR:  Regular rate and rhythm.  


ABDOMEN:  Ostomy with moderate flatus and stool, pink and patent.  Resolved left

lower abdominal wall erythema and abscess. Midline dressing clean dry and 

intact.


MUSCULOSKELETAL: Has clubbing of the fingers.  No lower extremity edema.


SKIN:  Warm and well perfused with good skin turgor.


NEUROLOGIC: Cranial nerves II through XII grossly intact. No focal or 

lateralizing signs. 


PSYCH:  Appropriate affect.  Alert and oriented to person, place and time. 

Displays appropriate insight.


: Vigil present with dark urine.





CLINCAL LABS: WBC now normal 8.9. Hemoglobin 8.7





ASSESSMENT: 


1.  Sepsis with colocutaneous fistula, resolved


2.  Crohn's, complicated


3.  Lack of medical careincluding medical insurance


4.  Family history of cardiovascular disease including a stroke


5.  Iron deficiency anemia


6.  Gastroesophageal reflux disease








PLAN: 


1.  He has complete turnaround including increased energy, increased bowel 

function with leukocytosis resolved.


2.  I reviewed discharge instructions including dietary guidelines, removal of 

staples in 2 weeks, dressing care, anticipated colostomy reversal pending 

results of pathology.


3.  Patient diet and advance to low fiber regular diet


4.  Discharge pending final education with ostomy nurse and family.


5.  Antibiotic management per infectious disease





Objective





- Vital Signs


Vital signs: 


                                   Vital Signs











Temp  98.2 F   02/25/21 08:00


 


Pulse  49 L  02/25/21 08:00


 


Resp  17   02/25/21 08:00


 


BP  117/69   02/25/21 08:00


 


Pulse Ox  95   02/25/21 08:00








                                 Intake & Output











 02/24/21 02/25/21 02/25/21





 18:59 06:59 18:59


 


Intake Total  250 


 


Output Total 1450 1100 350


 


Balance -1450 -850 -350


 


Weight 75.75 kg  


 


Intake:   


 


  Oral  250 


 


Output:   


 


  Gastric Drainage 700  


 


  Urine 750 500 


 


  Stool  600 350


 


Other:   


 


  # Voids 3 1 














- Labs


CBC & Chem 7: 


                                 02/25/21 06:12





                                 02/25/21 06:12


Labs: 


                  Abnormal Lab Results - Last 24 Hours (Table)











  02/25/21 02/25/21 Range/Units





  06:12 06:12 


 


RBC  3.26 L   (4.40-5.60)  X 10*6/uL


 


Hgb  8.7 L   (13.0-17.0)  g/dL


 


Hct  27.4 L   (39.6-50.0)  %


 


MCH  26.7 L   (27.0-32.0)  pg


 


MCHC  31.8 L   (32.0-37.0)  g/dL


 


Plt Count  460 H   (140-440)  X 10*3/uL


 


MPV  9.1 L   (9.5-12.2)  fL


 


Immature Gran #  0.19 H   (0.00-0.04)  X 10*3/uL


 


BUN/Creatinine Ratio   30.00 H  (12.00-20.00)  Ratio


 


Calcium   7.5 L  (8.7-10.3)  mg/dL














Assessment and Plan


(1) Left lower quadrant pain


Current Visit: Yes   Status: Acute   Code(s): R10.32 - LEFT LOWER QUADRANT PAIN 

 SNOMED Code(s): 422196057


   





(2) Crohn's disease


Current Visit: Yes   Status: Acute   Code(s): K50.90 - CROHN'S DISEASE, 

UNSPECIFIED, WITHOUT COMPLICATIONS   SNOMED Code(s): 62619234


   





(3) Leukocytosis


Current Visit: Yes   Status: Acute   Code(s): D72.829 - ELEVATED WHITE BLOOD 

CELL COUNT, UNSPECIFIED   SNOMED Code(s): 384272866


   





(4) Anemia, iron deficiency


Current Visit: Yes   Status: Acute   Code(s): D50.9 - IRON DEFICIENCY ANEMIA, 

UNSPECIFIED   SNOMED Code(s): 78533788


   





(5) Sepsis


Current Visit: Yes   Status: Acute   Code(s): A41.9 - SEPSIS, UNSPECIFIED 

ORGANISM   SNOMED Code(s): 61218747


   





(6) Colocutaneous fistula


Current Visit: Yes   Status: Acute   Code(s): K63.2 - FISTULA OF INTESTINE   

SNOMED Code(s): 530059305

## 2021-02-25 NOTE — P.DS
Providers


Date of admission: 


21 10:33





Expected date of discharge: 21


Attending physician: 


Lexi Morrissey





Consults: 





                                        





21 12:15


Consult Physician Urgent 


   Consulting Provider: Sekou Martinez


   Consult Reason/Comments: Cardiac clearance


   Do you want consulting provider notified?: Yes





21 05:35


Consult Physician Routine 


   Consulting Provider: Megan Almazan


   Consult Reason/Comments: Medical management


   Do you want consulting provider notified?: Yes, Notify in am





21 10:31


Consult Physician Routine 


   Consulting Provider: Julissa Maynard


   Consult Reason/Comments: Sepsis, colocutaneous fistula


   Do you want consulting provider notified?: Yes











Primary care physician: 


Physician Nonstaff








- Discharge Diagnosis(es)


(1) Left lower quadrant pain


Current Visit: Yes   Status: Acute   





(2) Crohn's disease


Current Visit: Yes   Status: Acute   





(3) Leukocytosis


Current Visit: Yes   Status: Acute   





(4) Anemia, iron deficiency


Current Visit: Yes   Status: Acute   





(5) Sepsis


Current Visit: Yes   Status: Acute   





(6) Colocutaneous fistula


Current Visit: Yes   Status: Acute   





(7) Family history of stroke


Current Visit: Yes   Status: Acute   





(8) Family history of heart disease


Current Visit: Yes   Status: Acute   





(9) Hypomagnesemia


Current Visit: Yes   Status: Acute   





(10) Ileus


Current Visit: Yes   Status: Acute   





(11) Crohn's disease with complication


Current Visit: Yes   Status: Acute   





(12) Does not have health insurance


Current Visit: Yes   Status: Acute   





(13) Abdominal wall abscess


Current Visit: Yes   Status: Acute   


Hospital Course: 











POSTOPERATIVE DIAGNOSES:


1.  Colocutaneous fistula, left lower quadrant with sepsis, resolved


2.  Complicated Crohn's disease with colocutaneous fistula


3.  Complicated subfascial abdominal wall abscess, left lower quadrant


4.  Large and small bowel obstruction due to sigmoid colon stricture 2


5.  Pre-existing iron deficiency anemia


6.  Abdominal ascites


7.  Expected ileus, multifactorial, resolved


8.  Hypomagnesia








COURSE:  The patient is a 60-year-old male who presented to the hospital 

initially with a diagnosis of left inguinal hernia with incarceration.  He 

presented with abdominal pain, sepsis including leukocytosis and fevers 

warranting a CT of the abdomen and pelvis.  Findings showed complicated sigmoid 

colocutaneous fistula extending to the left lower abdomen. His history is 

complicated by untreated Crohn's disease for over 20+ years due to lack of 

medical care and medical insurance.  Additionally, preoperative labs 

demonstrated pre-existing iron deficiency anemia with hemoglobin less than 12. 

He was treated with iron infusions. 


Prior to surgery, cardiology consultation was obtained with strong family hist

ory of stroke and heart disease.





Additional consultants including GI team, hospitalist, infectious disease was 

obtained.  He underwent open takedown of colocutaneous fistula with Trujillo's 

procedure and drainage of left lower quadrant abdominal wall abscess, 

2021.  Postoperatively, due to multifactorial pain including patient 

noncompliance with ambulation and electrolyte dyscrasias, patient had expected 

ileus treated with bowel rest and nasogastric tube decompression.  His ileus 

resolved after he  started ambulating with aggressive physical therapy and 

nursing care. Electrolyte dyscrasias were corrected.  





Prior to discharge, he was tolerating diet.  Ostomy education was reviewed.  

Antibiotic management was also arranged.  Social work and  were 

requested to address medical care discharge needs.


Procedures: 








OPERATION: 


1.  Diagnostic laparoscopy converted to open exploratory laparotomy


2.  Sigmoid colectomy with takedown of colocutaneous fistula


3.  Descending colostomy creation with defunctionalized rectum, Trujillo's 

procedure


4.  Open drainage of left lower quadrant subfascial complex abdominal wall 

abscess, 18 x 8 cm


5.  Mechanical debridement with pulse jet water lavage 3 L normal saline


6.  Application of wound VAC, Prevena universal midline and left lower quadrant


Patient Condition at Discharge: Good





Plan - Discharge Summary


Discharge Rx Participant: Yes


New Discharge Prescriptions: 


New


   Ciprofloxacin HCl [Cipro] 500 mg PO BID 10 Days #20 tab


   metroNIDAZOLE [Flagyl] 500 mg PO TID #30 tab


   Ibuprofen [Motrin] 600 mg PO Q8HR PRN #30 tab


     PRN Reason: Pain


   Omeprazole [PriLOSEC] 40 mg PO DAILY #14 cap


   Acetaminophen Tab [Tylenol Tab] 1,000 mg PO Q6HR PRN #30 tablet


     PRN Reason: Pain


Discharge Medication List





Acetaminophen Tab [Tylenol Tab] 1,000 mg PO Q6HR PRN #30 tablet 21 [Rx]


Ciprofloxacin HCl [Cipro] 500 mg PO BID 10 Days #20 tab 21 [Rx]


Ibuprofen [Motrin] 600 mg PO Q8HR PRN #30 tab 21 [Rx]


Omeprazole [PriLOSEC] 40 mg PO DAILY #14 cap 21 [Rx]


metroNIDAZOLE [Flagyl] 500 mg PO TID #30 tab 21 [Rx]








Follow up Appointment(s)/Referral(s): 


Alan Vo MD [STAFF PHYSICIAN] - 6 Weeks (Office will call patient with 

appointment date and time. )


Lexi Morrissey MD [STAFF PHYSICIAN] - 21


Wendy Don MD [STAFF PHYSICIAN] - 2 Weeks


Nonstaff,Physician [Primary Care Provider] - 1-2 days


Patient Instructions/Handouts:  Colectomy (DC), Colectomy Diet (DC), Colostomy 

Creation (DC)


Activity/Diet/Wound Care/Special Instructions: 


Resources for ostomy supplies:


1.  [facility]: [phone]


2.  [facility] on [phone]


3.  [employer]





Colostomy Care Recommendations from the Hospital:


LAst pouching system changed on:  2021


Mr Nguyen will have the following supplies for his colostomy from the hospital 

on the day of discharge:


Convatec Flange #[id number] (three) moldable wafers


Convatec pouches with filter #[id number] (three from the hospital)


Ostomy powder (one)


No sting prep pads (10 from the hospital) 





Mr Nguyen will also be receiving sample ostomy care supplies from [facility] 5-7 

days after discharge from the hospital 


Mr Nguyen is to change the entire pouching system every 3-5 days and empty the 

ostomy pouch when it is 1/2 to 1/3 full.


Home Care please assist Mr Nguyen to arrange for disposable pouches once home 

in 2-3 weeks   





Wear abdominal binder for comfort.


No lifting over 4 pounds in 4 weeks until .


May shower. 


No bath tub soaks for two weeks until 


Avoid steak, tough meats and seeds such as raspberry seeds.


Use Tylenol and ibuprofen scheduled for the next 24-48 hours for best pain 

relief.


Use ice along incisions for today to prevent swelling.


Discharge Disposition: HOME WITH HOME HEALTH SERVICES

## 2024-10-30 NOTE — P.CRDCN
Ambulatory Visit  Name: Guerita Garcia      : 1964      MRN: [id number]  Encounter Provider: Kerrie Ma DO  Encounter Date: 10/30/2024   Encounter department: [facility]    Assessment & Plan  Encounter for annual routine gynecological examination         Screening mammogram, encounter for         Bilateral fibrocystic breast changes    Orders:    US breast screening bilateral complete (ABUS); Future    Dense breasts    Orders:    US breast screening bilateral complete (ABUS); Future    Family history of breast cancer in female    Orders:    US breast screening bilateral complete (ABUS); Future    History of suburethral sling procedure         Pap smear done for cytology, reflex HPV.  Encouraged self breast examination as well as calcium supplementation.  Continue annual mammogram, already scheduled.  Reviewed automated breast ultrasound given high risk for breast cancer calculated score.  Reviewed colon cancer screening, up to date.  She will continue to follow-up with primary care as scheduled.  Return to office in 1 year or as needed    History of Present Illness     Guerita Garcia is a 60 y.o. female who presents     This is a pleasant 60-year-old female P2 ( x 2, age 31, 26) presents for her GYN exam.  She went through menopause at age 50.  She has never been on hormone replacement therapy.  She denies any vaginal bleeding or spotting.  No changes in bowel or bladder function.  She has been in a monogamous relationship for over 31 years.  Pap smears have been normal.  In the course of the year she was evaluated by urogynecology, history of suburethral sling with urinary incontinence.  She underwent suburethral bulking injections with improvement.    She was also evaluated by endocrinology, history of osteopenia.  They recommended Forteo injection.  This was not covered by her insurance.  She is taking calcium 1200 mg daily with vitamin D as well as weightbearing  History of Present Illness


History of present illness: 


 


HISTORY OF PRESENTING ILLNESS


This is a pleasant 60-year-old  male past medical history significant 

for crohns disease and colitis.  He denies prior history of coronary artery dise

ase and does not follow in the office with a cardiologist. We have been asked to

see in consultation for preoperative evaluation.  Seen and examined resting 

comfortably sitting up in bed in no acute distress.  He denies symptoms of chest

pain, shortness of breath, dizziness or palpitations.  He states on a regular 

basis he is quite active and denies ever having had exertional dyspnea or chest 

pain.  He is complaining of abdominal discomfort.  He is scheduled to undergo 

robotic sigmoid colostomy creation this afternoon with Dr. Morrissey.  

Echocardiogram obtained reveals preserved LV systolic function with ejection 

fraction 60-65%.





DIAGNOSTICS


EKG reveals sinus mechanism with no acute ST or T wave abnormalities noted.


Chest xray negative for an acute cardiopulmonary process.


Laboratory reviewed, WBC 13.4, hemoglobin 10.2, platelets 367, ESR 91, sodium 

136, potassium 4.2, creatinine 0.9, .





REVIEW OF SYSTEMS


At the time of my exam:


CONSTITUTIONAL: Denies fever or chills.


CARDIOVASCULAR: Denies chest pain, shortness of breath, orthopnea, PND or 

palpitations.


RESPIRATORY: Denies cough. 


GASTROINTESTINAL: Complains of abdominal pain.  Denies diarrhea, constipation, 

nausea or vomiting.


MUSCULOSKELETAL: Denies myalgias.


NEUROLOGIC: Denies numbness, tingling, headacbe or weakness.


ENDOCRINE: Denies fatigue, weight change,  polydipsia or polyurina.


GENITOURINARY: Denies burning, hematuria or urgency with micturation.


HEMATOLOGIC: Denies history of anemia or bleeding. 





PHYSICAL EXAMINATION


Blood pressure 96/56 heart rate 65 afebrile and maintaining oxygen saturation on

room air.


CONSTITUTIONAL: No apparent distress. 


HEENT: Head is normocephalic. Pupils are equal, round. Sclerae anicteric. Mucous

membranes of the mouth are moist.  No JVD. No carotid bruit.


CHEST EXAMINATION: Lungs are clear to auscultation. No chest wall tenderness is 

noted on palpation or with deep breathing. 


HEART EXAMINATION: Regular rate and rhythm. S1, S2 heard. No murmurs, gallops or

rub.


ABDOMEN: Soft, nontender. Positive bowel sounds.


EXTREMITIES: 2+ peripheral pulses, no lower extremity edema and no calf 

tenderness.


NEUROLOGIC EXAMINATION: Patient is awake, alert and oriented x3. 





ASSESSMENT


Abdominal pain


Crohn's disease


Leuckocytosis





PLAN


He has no symptoms of angina or heart failure. Clinically he is euvolemic. 


No absolute contraindication to undergo surgical intervention. 


Thank you kindly for this consultation.








Nurse Practitioner note has been reviewed, I agree with a documented findings 

and plan of care.  Patient was seen and examined.











Past Medical History


Additional Past Medical History / Comment(s): collitis, chrons


History of Any Multi-Drug Resistant Organisms: None Reported


Past Surgical History: No Surgical Hx Reported


Additional Past Surgical History / Comment(s): Colonoscopy last one 22 years ago


Past Psychological History: No Psychological Hx Reported


Smoking Status: Never smoker


Past Alcohol Use History: Rare


Past Drug Use History: None Reported





- Past Family History


  ** family


Additional Family Medical History / Comment(s): Grandmother MI in late 60s, 

Grandfather CVA, Mother CVA X 2 age 90





Medications and Allergies


                                Home Medications











 Medication  Instructions  Recorded  Confirmed  Type


 


No Known Home Medications  02/17/21 02/17/21 History








                                    Allergies











Allergy/AdvReac Type Severity Reaction Status Date / Time


 


Milk Containing Products Allergy  Unknown Verified 02/17/21 19:48





[Dairy]     














Physical Exam


Vitals: 


                                   Vital Signs











  Temp Pulse Resp BP BP Pulse Ox


 


 02/19/21 08:00   65    


 


 02/19/21 07:00  97.7 F  65  16   96/56  97


 


 02/19/21 03:45  97.9 F  70   111/61   97


 


 02/19/21 02:00   70    


 


 02/18/21 19:00  98.6 F  57 L   106/54   100


 


 02/18/21 14:10  97.8 F  61  16  108/63   99








                                Intake and Output











 02/18/21 02/19/21 02/19/21





 22:59 06:59 14:59


 


Other:   


 


  Voiding Method Toilet Toilet Toilet


 


  # Voids 1 1 














Results





                                 02/18/21 06:03





                                 02/18/21 06:03


                                       CBC











  02/18/21 Range/Units





  06:03 


 


WBC  13.40 H  (4.50-10.00)  X 10*3/uL


 


RBC  3.77 L  (4.40-5.60)  X 10*6/uL


 


Hgb  10.2 L  (13.0-17.0)  g/dL


 


Hct  31.4 L  (39.6-50.0)  %


 


Plt Count  367  (140-440)  X 10*3/uL








                          Comprehensive Metabolic Panel











  02/18/21 Range/Units





  06:03 


 


Sodium  136  (135-145)  mmol/L


 


Potassium  4.2  (3.5-5.5)  mmol/L


 


Chloride  103  ()  mmol/L


 


Carbon Dioxide  23.1  (21.6-31.8)  mmol/L


 


BUN  16.0  (9.0-27.0)  mg/dL


 


Creatinine  0.9  (0.6-1.5)  mg/dL


 


Glucose  98  ()  mg/dL


 


Calcium  8.0 L  (8.7-10.3)  mg/dL








                               Current Medications











Generic Name Dose Route Start Last Admin





  Trade Name Freq  PRN Reason Stop Dose Admin


 


Acetaminophen  1,000 mg  02/18/21 00:00  02/19/21 05:50





  Acetaminophen Tab 500 Mg Tab  PO   Not Given





  Q6HR LAMBERTO  


 


Enoxaparin Sodium  30 mg  02/18/21 09:00  02/18/21 08:19





  Enoxaparin 30 Mg/0.3 Ml Syringe  SQ   30 mg





  DAILY LAMBERTO   Administration


 


Gabapentin  300 mg  02/18/21 09:00  02/18/21 22:40





  Gabapentin 300 Mg Cap  PO   300 mg





  TID LAMBERTO   Administration


 


Hydromorphone HCl  1 mg  02/17/21 19:42  02/19/21 05:07





  Hydromorphone 1 Mg/Ml 1 Ml Syringe  IVP   1 mg





  Q3HR PRN   Administration





  Severe Pain  


 


Hydromorphone HCl  0.5 mg  02/17/21 19:42 





  Hydromorphone 0.5 Mg/0.5 Ml Syringe  IVP  





  Q3HR PRN  





  Moderate Pain  


 


Sodium Chloride  1,000 mls @ 120 mls/hr  02/17/21 19:45  02/19/21 05:08





  Saline 0.9%  IV   Not Given





  .Q8H20M LAMBERTO  


 


Metronidazole 500 mg/ IV  100 mls @ 100 mls/hr  02/18/21 11:00  02/19/21 05:05





  Solution  IVPB   100 mls/hr





  Q6H LAMBERTO   Administration


 


Piperacillin Sod/Tazobactam  100 mls @ 25 mls/hr  02/18/21 12:00  02/19/21 03:39





  Sod 3.375 gm/ Sodium Chloride  IVPB   25 mls/hr





  Q8H LAMBERTO   Administration


 


Ferric Sodium Gluconate 125 mg  110 mls @ 100 mls/hr  02/18/21 22:15  02/18/21 

23:06





  / Sodium Chloride  IVPB  02/20/21 22:05  100 mls/hr





  DAILY@2100 LAMBERTO   Administration


 


Ketorolac Tromethamine  15 mg  02/18/21 00:00  02/19/21 05:03





  Ketorolac 15 Mg/Ml 1 Ml Vial  IVP  02/19/21 18:01  15 mg





  Q6HR LAMBERTO   Administration


 


Metoclopramide HCl  10 mg  02/17/21 23:10 





  Metoclopramide 5 Mg/Ml 2 Ml Vial  IVP  





  Q6H PRN  





  Nausea And Vomiting  


 


Naloxone HCl  0.2 mg  02/17/21 19:42 





  Naloxone 0.4 Mg/Ml 1 Ml Vial  IV  





  Q2M PRN  





  Opioid Reversal  


 


Ondansetron HCl  4 mg  02/17/21 23:10  02/19/21 05:29





  Ondansetron 4 Mg/2 Ml Vial  IVP   4 mg





  Q6HR PRN   Administration





  Nausea And Vomiting  


 


Pantoprazole Sodium  40 mg  02/18/21 09:00  02/18/21 08:18





  Pantoprazole 40 Mg/10 Ml Vial  IV   40 mg





  DAILY LAMBERTO   Administration


 


Tamsulosin HCl  0.4 mg  02/18/21 08:30  02/18/21 08:19





  Tamsulosin 0.4 Mg Cap.Er.24h  PO   0.4 mg





  PC-BRKFST LAMBERTO   Administration








                                Intake and Output











 02/18/21 02/19/21 02/19/21





 22:59 06:59 14:59


 


Other:   


 


  Voiding Method Toilet Toilet Toilet


 


  # Voids 1 1 








                                        





                                 02/18/21 06:03 





                                 02/18/21 06:03 exercises.    Colon 2018 f/u 10 yrs    Mammo 4/2024    ABUS 9/2024    Dexa 7/2024 osteopenia        History obtained from : patient  Review of Systems   Constitutional:  Negative for fatigue, fever and unexpected weight change.   Respiratory:  Negative for cough, chest tightness, shortness of breath and wheezing.    Cardiovascular: Negative.  Negative for chest pain and palpitations.   Gastrointestinal: Negative.  Negative for abdominal distention, abdominal pain, blood in stool, constipation, diarrhea, nausea and vomiting.   Genitourinary: Negative.  Negative for difficulty urinating, dyspareunia, dysuria, flank pain, frequency, genital sores, hematuria, pelvic pain, urgency, vaginal bleeding, vaginal discharge and vaginal pain.   Skin:  Negative for rash.     Current Outpatient Medications on File Prior to Visit   Medication Sig Dispense Refill    CALCIUM PO Take by mouth      Cholecalciferol (VITAMIN D3) 1000 units CAPS Take 2,000 Units by mouth daily        co-enzyme Q-10 30 MG capsule Take 30 mg by mouth 3 (three) times a day      Garlic 1000 MG CAPS Take 1,000 mg by mouth daily      multivitamin (THERAGRAN) TABS Take 1 tablet by mouth daily      Omega-3 Fatty Acids (fish oil) 1,000 mg Take 1,000 mg by mouth daily      rosuvastatin (CRESTOR) 5 mg tablet Take 1 tablet (5 mg total) by mouth daily 100 tablet 1    traZODone (DESYREL) 50 mg tablet TAKE 1 TO 2 TABLETS BY MOUTH AT BEDTIME AS NEEDED FOR INSOMNIA 60 tablet 5    benzonatate (TESSALON) 200 MG capsule Take 1 capsule (200 mg total) by mouth 3 (three) times a day as needed for cough (Patient not taking: Reported on 10/30/2024) 30 capsule 0    Teriparatide, Recombinant, 600 MCG/2.4ML SOPN Inject 20 mcg under the skin in the morning (Patient not taking: Reported on 10/30/2024) 7.2 mL 3    triamcinolone (KENALOG) 0.1 % cream Apply topically 2 (two) times a day (Patient not taking: Reported on 10/30/2024) 453.6 g 0     No current facility-administered  "medications on file prior to visit.          Objective     /72   Ht 5' 8.5\" (1.74 m)   Wt 85.6 kg (188 lb 12.8 oz)   BMI 28.29 kg/m²     Physical Exam  Constitutional:       Appearance: Normal appearance. She is well-developed.   HENT:      Head: Normocephalic and atraumatic.   Cardiovascular:      Rate and Rhythm: Normal rate and regular rhythm.   Pulmonary:      Effort: Pulmonary effort is normal.      Breath sounds: Normal breath sounds.   Chest:   Breasts:     Right: No inverted nipple, mass, nipple discharge, skin change or tenderness.      Left: No inverted nipple, mass, nipple discharge, skin change or tenderness.   Abdominal:      General: Bowel sounds are normal. There is no distension.      Palpations: Abdomen is soft.      Tenderness: There is no abdominal tenderness. There is no guarding or rebound.   Genitourinary:     Labia:         Right: No rash, tenderness or lesion.         Left: No rash, tenderness or lesion.       Vagina: Normal. No signs of injury. No vaginal discharge or tenderness.      Cervix: No cervical motion tenderness, discharge, friability, lesion or cervical bleeding.      Uterus: Not enlarged and not tender.       Adnexa:         Right: No mass, tenderness or fullness.          Left: No mass, tenderness or fullness.     Neurological:      Mental Status: She is alert.   Psychiatric:         Behavior: Behavior normal.       Administrative Statements   I have spent a total time of 30 minutes in caring for this patient on the day of the visit/encounter including Prognosis, Risks and benefits of tx options, Instructions for management, Impressions, Counseling / Coordination of care, Documenting in the medical record, Reviewing / ordering tests, medicine, procedures  , and Obtaining or reviewing history  .  "

## 2025-01-07 ENCOUNTER — HOSPITAL ENCOUNTER (EMERGENCY)
Dept: HOSPITAL 47 - EC | Age: 65
Discharge: HOME | End: 2025-01-07
Payer: COMMERCIAL

## 2025-01-07 VITALS — RESPIRATION RATE: 18 BRPM

## 2025-01-07 VITALS — HEART RATE: 54 BPM | DIASTOLIC BLOOD PRESSURE: 82 MMHG | SYSTOLIC BLOOD PRESSURE: 134 MMHG

## 2025-01-07 VITALS — TEMPERATURE: 98.2 F

## 2025-01-07 DIAGNOSIS — J10.1: Primary | ICD-10-CM

## 2025-01-07 DIAGNOSIS — Z11.52: ICD-10-CM

## 2025-01-07 DIAGNOSIS — Z91.011: ICD-10-CM

## 2025-01-07 DIAGNOSIS — R19.8: ICD-10-CM

## 2025-01-07 LAB
ALBUMIN SERPL-MCNC: 4.4 G/DL (ref 3.5–5)
ALP SERPL-CCNC: 84 U/L (ref 38–126)
ALT SERPL-CCNC: 18 U/L (ref 4–49)
AMYLASE SERPL-CCNC: 63 U/L (ref 30–110)
ANION GAP SERPL CALC-SCNC: 6 MMOL/L
APTT BLD: 24.2 SEC (ref 22–30)
AST SERPL-CCNC: 20 U/L (ref 17–59)
BASOPHILS # BLD AUTO: 0 K/UL (ref 0–0.2)
BASOPHILS NFR BLD AUTO: 0 %
BUN SERPL-SCNC: 12 MG/DL (ref 9–20)
CALCIUM SPEC-MCNC: 9.4 MG/DL (ref 8.4–10.2)
CHLORIDE SERPL-SCNC: 105 MMOL/L (ref 98–107)
CO2 SERPL-SCNC: 28 MMOL/L (ref 22–30)
EOSINOPHIL # BLD AUTO: 0.1 K/UL (ref 0–0.7)
EOSINOPHIL NFR BLD AUTO: 1 %
ERYTHROCYTE [DISTWIDTH] IN BLOOD BY AUTOMATED COUNT: 5.05 M/UL (ref 4.3–5.9)
ERYTHROCYTE [DISTWIDTH] IN BLOOD: 12.7 % (ref 11.5–15.5)
GLUCOSE SERPL-MCNC: 91 MG/DL (ref 74–99)
HCT VFR BLD AUTO: 43.1 % (ref 39–53)
HGB BLD-MCNC: 14.8 GM/DL (ref 13–17.5)
INR PPP: 1 (ref ?–1.2)
LIPASE SERPL-CCNC: 97 U/L (ref 23–300)
LYMPHOCYTES # SPEC AUTO: 1.4 K/UL (ref 1–4.8)
LYMPHOCYTES NFR SPEC AUTO: 19 %
MCH RBC QN AUTO: 29.3 PG (ref 25–35)
MCHC RBC AUTO-ENTMCNC: 34.3 G/DL (ref 31–37)
MCV RBC AUTO: 85.4 FL (ref 80–100)
MONOCYTES # BLD AUTO: 0.4 K/UL (ref 0–1)
MONOCYTES NFR BLD AUTO: 5 %
NEUTROPHILS # BLD AUTO: 5.6 K/UL (ref 1.3–7.7)
NEUTROPHILS NFR BLD AUTO: 74 %
PH UR: 7 [PH] (ref 5–8)
PLATELET # BLD AUTO: 314 K/UL (ref 150–450)
POTASSIUM SERPL-SCNC: 4.2 MMOL/L (ref 3.5–5.1)
PROT SERPL-MCNC: 7.4 G/DL (ref 6.3–8.2)
PT BLD: 10.7 SEC (ref 10–12.5)
SODIUM SERPL-SCNC: 139 MMOL/L (ref 137–145)
SP GR UR: 1 (ref 1–1.03)
UROBILINOGEN UR QL STRIP: <2 MG/DL (ref ?–2)
WBC # BLD AUTO: 7.5 K/UL (ref 3.8–10.6)

## 2025-01-07 PROCEDURE — 74177 CT ABD & PELVIS W/CONTRAST: CPT

## 2025-01-07 PROCEDURE — 83605 ASSAY OF LACTIC ACID: CPT

## 2025-01-07 PROCEDURE — 85730 THROMBOPLASTIN TIME PARTIAL: CPT

## 2025-01-07 PROCEDURE — 96361 HYDRATE IV INFUSION ADD-ON: CPT

## 2025-01-07 PROCEDURE — 83690 ASSAY OF LIPASE: CPT

## 2025-01-07 PROCEDURE — 87636 SARSCOV2 & INF A&B AMP PRB: CPT

## 2025-01-07 PROCEDURE — 81003 URINALYSIS AUTO W/O SCOPE: CPT

## 2025-01-07 PROCEDURE — 80053 COMPREHEN METABOLIC PANEL: CPT

## 2025-01-07 PROCEDURE — 99284 EMERGENCY DEPT VISIT MOD MDM: CPT

## 2025-01-07 PROCEDURE — 85610 PROTHROMBIN TIME: CPT

## 2025-01-07 PROCEDURE — 85025 COMPLETE CBC W/AUTO DIFF WBC: CPT

## 2025-01-07 PROCEDURE — 82150 ASSAY OF AMYLASE: CPT

## 2025-01-07 PROCEDURE — 96375 TX/PRO/DX INJ NEW DRUG ADDON: CPT

## 2025-01-07 PROCEDURE — 96374 THER/PROPH/DIAG INJ IV PUSH: CPT

## 2025-01-07 PROCEDURE — 36415 COLL VENOUS BLD VENIPUNCTURE: CPT

## 2025-01-07 RX ADMIN — CEFAZOLIN STA MLS/HR: 330 INJECTION, POWDER, FOR SOLUTION INTRAMUSCULAR; INTRAVENOUS at 11:59

## 2025-01-07 RX ADMIN — PANTOPRAZOLE SODIUM STA MG: 40 INJECTION, POWDER, FOR SOLUTION INTRAVENOUS at 11:56

## 2025-01-07 RX ADMIN — ONDANSETRON STA MG: 2 INJECTION INTRAMUSCULAR; INTRAVENOUS at 11:57

## 2025-01-07 NOTE — ED
General Adult HPI





- General


Chief complaint: Abdominal Pain


Stated complaint: discharge from rectum


Time Seen by Provider: 01/07/25 11:31


Source: patient, RN notes reviewed, old records reviewed


Mode of arrival: ambulatory


Limitations: no limitations





- History of Present Illness


Initial comments: 





Patient is a 64-year-old male with past medical history remarkable for colostomy

secondary to bowel obstruction performed by Dr. Morrissey in 2021.  Has a 

history of Crohn's disease.  Has no other acute complaints.  Presents over 

concern for some mucousy discharge from his rectal stump.  States has been 

ongoing since Sunday.  Recently was having URI symptoms last week which are 

improving.  Has been over 7 days of this.  Denies any significant change in 

stooling, nausea or vomiting.  Denies significant abdominal pain.  Denies chest 

pain or shortness of breath.  Presents for further evaluation at this time for 

discharge from his rectal stump.  Does have a history of previous fistula which 

has since healed.  No other acute complaints at this time.  Presents for further

evaluation.  Has not followed up with surgeon or GI doctor in many years.





- Related Data


                                Home Medications











 Medication  Instructions  Recorded  Confirmed


 


No Known Home Medications  01/07/25 01/07/25











                                    Allergies











Allergy/AdvReac Type Severity Reaction Status Date / Time


 


Milk Containing Products Allergy  Nausea & Verified 01/07/25 12:48





(Dairy)   Vomiting &  





[Dairy]   Diarrhea  














Review of Systems


ROS Statement: 


Those systems with pertinent positive or pertinent negative responses have been 

documented in the HPI.


Review of Systems:


CONST: Denies fever


EYES: Denies blurry vision


ENT: Denies nasal congestion


C/V: Denies Chest pain


RESP: Denies shortness of breath


GI: Endorses rectal discharge.


: Denies dysuria


SKIN: Denies rash.


MSK: Denies joint pain.


NEURO: Denies headache





ROS Other: All systems not noted in ROS Statement are negative.





Past Medical History


Additional Past Medical History / Comment(s): collitis, chrons


History of Any Multi-Drug Resistant Organisms: None Reported


Past Surgical History: No Surgical Hx Reported


Additional Past Surgical History / Comment(s): Colonoscopy last one 22 years ago


Past Psychological History: No Psychological Hx Reported


Smoking Status: Never smoker


Past Alcohol Use History: Rare


Past Drug Use History: None Reported





- Past Family History


  ** family


Additional Family Medical History / Comment(s): Grandmother MI in late 60s, 

Grandfather CVA, Mother CVA X 2 age 90





General Exam





- General Exam Comments


Initial Comments: 





General: Appears in no acute distress.


HEAD: Normal with no signs of head trauma.


EYES: EOMI


ENT: Hearing grossly intact, normal oropharynx.


RESPIRATORY: Clear breath sounds bilaterally.  No wheezes, rales, or rhonchi.


C/V: Regular rate and rhythm. S1 and S2 auscultated, no edema, peripheral pulses

 2+ and intact throughout


ABD: Abd is soft, nontender, nondistended.  Ostomy bag is functioning properly. 

 No significant tenderness to palpation of the abdomen.  Rectal exam performed 

and revealed no evidence of rectal discharge.  No bleeding.  Patient has old 

fistula sites that appear well-healed.  No concern for infectious process at 

this time.


EXT: Normal range of motion, no obvious deformity


SKIN: No rashes or lesions observed on exposed skin.


NEURO: Alert and oriented x 4.





Limitations: no limitations





Course


                                   Vital Signs











  01/07/25 01/07/25 01/07/25





  10:59 12:00 13:08


 


Temperature 98.2 F  


 


Pulse Rate 54 L 51 L 53 L


 


Respiratory 16 20 18





Rate   


 


Blood Pressure 134/78  134/72


 


O2 Sat by Pulse 100 97 100





Oximetry   














Medical Decision Making





- Medical Decision Making





Was pt. sent in by a medical professional or institution (, PA, NP, urgent 

care, hospital, or nursing home...) When possible be specific


@  -No


Did you speak to anyone other than the patient for history (EMS, parent, family,

 police, friend...)? What history was obtained from this source 


@  -No


Did you review nursing and triage notes (agree or disagree)?  Why? 


@  -I reviewed and agree with nursing and triage notes


Were old charts reviewed (outside hosp., previous admission, EMS record, old 

EKG, old radiological studies, urgent care reports/EKG's, nursing home records)?

 Report findings 


@  -No old charts were reviewed


Differential Diagnosis (chest pain, altered mental status, abdominal pain women,

 abdominal pain men, vaginal bleeding, weakness, fever, dyspnea, syncope, 

headache, dizziness, GI bleed, back pain, seizure, CVA, palpatations, mental 

health, musculoskeletal)? 


@  -Differential Abdominal Pain Men:


Appendicitis, cholecystitis, diverticulosis, ischemic bowel, pancreatitis, 

hepatitis, UTI, gastroenteritis, AAA, incarcerated hernia, bowel obstruction, 

constipation, inflammatory bowel, hepatitis, peptic ulcer disease, splenic 

infarction, perforated viscus, testicular torsion, this is not meant to be an 

all-inclusive list





EKG interpreted by me (3pts min.).


@  -None done


X-rays interpreted by me (1pt min.).


@  -None done


CT interpreted by me (1pt min.).


@  -CT abdomen pelvis reveals no obvious acute intra-abdominal process to 

account for the patient's current symptoms.  No complications of the rectal 

stump or colostomy site.


U/S interpreted by me (1pt. min.).


@  -None done


What testing was considered but not performed or refused? (CT, X-rays, U/S, 

labs)? Why?


@  -None


What meds were considered but not given or refused? Why?


@  -None


Did you discuss the management of the patient with other professionals (carlene purdy i.e. , PA, NP, lab, RT, psych nurse, , , 

teacher, , )? Give summary


@  -No


Was smoking cessation discussed for >3mins.?


@  -No


Was critical care preformed (if so, how long)?


@  -No


Were there social determinants of health that impacted care today? How? 

(Homelessness, low income, unemployed, alcoholism, drug addiction, 

transportation, low edu. Level, literacy, decrease access to med. care, skilled nursing, 

rehab)?


@  -No


Was there de-escalation of care discussed even if they declined (Discuss DNR or 

withdrawal of care, Hospice)? DNR status


@  -No


What co-morbidities impacted this encounter? (DM, HTN, Smoking, COPD, CAD, 

Cancer, CVA, ARF, Chemo, Hep., AIDS, mental health diagnosis, sleep apnea, 

morbid obesity)?


@  -None


Was patient admitted / discharged? Hospital course, mention meds given and 

route, prescriptions, significant lab abnormalities, going to OR and other 

pertinent info.


@  -Patient presents over concern for URI symptoms as well as some mucousy 

discharge from rectal stump that has been ongoing for multiple days.  Vital 

signs within acceptable limits.  Exam is unremarkable including rectal exam.  

Colostomy is functioning properly.  Vitals are within acceptable limits.  We 

will obtain abdominal CT as well as abdominal laboratory studies.  He will be 

given IV fluids, Zofran, Protonix.  Patient in agreement this plan.





Laboratory studies are all within acceptable limits.  Patient is flu A+.  CT 

revealed no evidence of acute intra-abdominal process.





I discussed results with the patient.  Remains asymptomatic at this time.  We 

discussed his results.  Recommended follow-up with his surgeon Dr. Morrissey as 

he has not followed up with her in multiple years as well as Dr. Coreas his 

gastroenterologist.  He was in agreement this plan.  Strict return precautions 

discussed.





I instructed the patient to follow up with their PCP in the next 1-3 days.  I 

explained that the patient should return to the emergency department if they 

experience any worsening symptoms. Strict return precautions were discussed with

 the patient. The patient expressed understanding of these instructions. I 

answered all questions that the patient had. The patient was discharged home in 

good condition with their prescriptions and follow up information.


Undiagnosed new problem with uncertain prognosis?


@  -No


Drug Therapy requiring intensive monitoring for toxicity (Heparin, Nitro, 

Insulin, Cardizem)?


@  -No


Were any procedures done?


@  -No


Diagnosis/symptom?


@  -Default


Acute, or Chronic, or Acute on Chronic?


@  -Default


Uncomplicated (without systemic symptoms) or Complicated (systemic symptoms)?


@  -Default


Side effects of treatment?


@  -No


Exacerbation, Progression, or Severe Exacerbation?


@  -No


Poses a threat to life or bodily function? How? (Chest pain, USA, MI, pneumonia,

 PE, COPD, DKA, ARF, appy, cholecystitis, CVA, Diverticulitis, Homicidal, 

Suicidal, threat to staff... and all critical care pts)


@  -No








- Lab Data


Result diagrams: 


                                 01/07/25 12:00





                                 01/07/25 12:00


                                   Lab Results











  01/07/25 01/07/25 01/07/25 Range/Units





  12:00 12:00 12:00 


 


WBC  7.5    (3.8-10.6)  k/uL


 


RBC  5.05    (4.30-5.90)  m/uL


 


Hgb  14.8    (13.0-17.5)  gm/dL


 


Hct  43.1    (39.0-53.0)  %


 


MCV  85.4    (80.0-100.0)  fL


 


MCH  29.3    (25.0-35.0)  pg


 


MCHC  34.3    (31.0-37.0)  g/dL


 


RDW  12.7    (11.5-15.5)  %


 


Plt Count  314    (150-450)  k/uL


 


MPV  7.4    


 


Neutrophils %  74    %


 


Lymphocytes %  19    %


 


Monocytes %  5    %


 


Eosinophils %  1    %


 


Basophils %  0    %


 


Neutrophils #  5.6    (1.3-7.7)  k/uL


 


Lymphocytes #  1.4    (1.0-4.8)  k/uL


 


Monocytes #  0.4    (0-1.0)  k/uL


 


Eosinophils #  0.1    (0-0.7)  k/uL


 


Basophils #  0.0    (0-0.2)  k/uL


 


PT   10.7   (10.0-12.5)  sec


 


INR   1.0   (<1.2)  


 


APTT   24.2   (22.0-30.0)  sec


 


Sodium     (137-145)  mmol/L


 


Potassium     (3.5-5.1)  mmol/L


 


Chloride     ()  mmol/L


 


Carbon Dioxide     (22-30)  mmol/L


 


Anion Gap     mmol/L


 


BUN     (9-20)  mg/dL


 


Creatinine     (0.66-1.25)  mg/dL


 


Est GFR (CKD-EPI)AfAm     (>60 ml/min/1.73 sqM)  


 


Est GFR (CKD-EPI)NonAf     (>60 ml/min/1.73 sqM)  


 


Glucose     (74-99)  mg/dL


 


Plasma Lactic Acid Addison     (0.7-2.0)  mmol/L


 


Calcium     (8.4-10.2)  mg/dL


 


Total Bilirubin     (0.2-1.3)  mg/dL


 


AST     (17-59)  U/L


 


ALT     (4-49)  U/L


 


Alkaline Phosphatase     ()  U/L


 


Total Protein     (6.3-8.2)  g/dL


 


Albumin     (3.5-5.0)  g/dL


 


Amylase     ()  U/L


 


Lipase     ()  U/L


 


Urine Color    Colorless  


 


Urine Appearance    Clear  (Clear)  


 


Urine pH    7.0  (5.0-8.0)  


 


Ur Specific Gravity    1.002  (1.001-1.035)  


 


Urine Protein    Negative  (Negative)  


 


Urine Glucose (UA)    Negative  (Negative)  


 


Urine Ketones    Negative  (Negative)  


 


Urine Blood    Negative  (Negative)  


 


Urine Nitrite    Negative  (Negative)  


 


Urine Bilirubin    Negative  (Negative)  


 


Urine Urobilinogen    <2.0  (<2.0)  mg/dL


 


Ur Leukocyte Esterase    Negative  (Negative)  


 


Influenza Type A (PCR)     (Not Detectd)  


 


Influenza Type B (PCR)     (Not Detectd)  


 


RSV (PCR)     (Not Detectd)  


 


SARS-CoV-2 (PCR)     (Not Detectd)  














  01/07/25 01/07/25 01/07/25 Range/Units





  12:00 12:00 12:00 


 


WBC     (3.8-10.6)  k/uL


 


RBC     (4.30-5.90)  m/uL


 


Hgb     (13.0-17.5)  gm/dL


 


Hct     (39.0-53.0)  %


 


MCV     (80.0-100.0)  fL


 


MCH     (25.0-35.0)  pg


 


MCHC     (31.0-37.0)  g/dL


 


RDW     (11.5-15.5)  %


 


Plt Count     (150-450)  k/uL


 


MPV     


 


Neutrophils %     %


 


Lymphocytes %     %


 


Monocytes %     %


 


Eosinophils %     %


 


Basophils %     %


 


Neutrophils #     (1.3-7.7)  k/uL


 


Lymphocytes #     (1.0-4.8)  k/uL


 


Monocytes #     (0-1.0)  k/uL


 


Eosinophils #     (0-0.7)  k/uL


 


Basophils #     (0-0.2)  k/uL


 


PT     (10.0-12.5)  sec


 


INR     (<1.2)  


 


APTT     (22.0-30.0)  sec


 


Sodium  139    (137-145)  mmol/L


 


Potassium  4.2    (3.5-5.1)  mmol/L


 


Chloride  105    ()  mmol/L


 


Carbon Dioxide  28    (22-30)  mmol/L


 


Anion Gap  6    mmol/L


 


BUN  12    (9-20)  mg/dL


 


Creatinine  0.78    (0.66-1.25)  mg/dL


 


Est GFR (CKD-EPI)AfAm  >90    (>60 ml/min/1.73 sqM)  


 


Est GFR (CKD-EPI)NonAf  >90    (>60 ml/min/1.73 sqM)  


 


Glucose  91    (74-99)  mg/dL


 


Plasma Lactic Acid Addison   1.5   (0.7-2.0)  mmol/L


 


Calcium  9.4    (8.4-10.2)  mg/dL


 


Total Bilirubin  0.9    (0.2-1.3)  mg/dL


 


AST  20    (17-59)  U/L


 


ALT  18    (4-49)  U/L


 


Alkaline Phosphatase  84    ()  U/L


 


Total Protein  7.4    (6.3-8.2)  g/dL


 


Albumin  4.4    (3.5-5.0)  g/dL


 


Amylase  63    ()  U/L


 


Lipase  97    ()  U/L


 


Urine Color     


 


Urine Appearance     (Clear)  


 


Urine pH     (5.0-8.0)  


 


Ur Specific Gravity     (1.001-1.035)  


 


Urine Protein     (Negative)  


 


Urine Glucose (UA)     (Negative)  


 


Urine Ketones     (Negative)  


 


Urine Blood     (Negative)  


 


Urine Nitrite     (Negative)  


 


Urine Bilirubin     (Negative)  


 


Urine Urobilinogen     (<2.0)  mg/dL


 


Ur Leukocyte Esterase     (Negative)  


 


Influenza Type A (PCR)    Detected A  (Not Detectd)  


 


Influenza Type B (PCR)    Not Detected  (Not Detectd)  


 


RSV (PCR)    Not Detected  (Not Detectd)  


 


SARS-CoV-2 (PCR)    Not Detected  (Not Detectd)  














Disposition


Clinical Impression: 


 Influenza A, Rectal discharge





Disposition: HOME SELF-CARE


Condition: Good


Additional Instructions: 


Follow-up with your PCP as well as general surgeon Dr. Morrissey and gastr

oenterologist Dr. Don.  Return if any worsening symptoms.  You do have 

influenza A however symptoms started last week and you are likely near the end 

of your infectious process for it.  Return if any worsening symptoms.


Is patient prescribed a controlled substance at d/c from ED?: No


Referrals: 


None,Stated [Primary Care Provider] - 1-2 days


Lexi Morrissey MD [STAFF PHYSICIAN] - 1-2 days


Wendy Don MD [STAFF PHYSICIAN] - 1-2 days


Time of Disposition: 13:46

## 2025-01-07 NOTE — CT
EXAMINATION TYPE: CT abdomen pelvis w con

 

DATE OF EXAM: 1/7/2025

 

COMPARISON: Prior CT February 23, 2021

 

CLINICAL INDICATION: Male, 64 years old with history of abdominal pain, rectal discharge with colosto
my; PHH, Rectal discharge x 2 days with colostomy.

 

TECHNIQUE:

Performed without Oral Contrast and with IV Contrast, patient injected with 100 mL of Isovue 300.

CT DLP: 876.2 mGycm

Automated exposure control for dose reduction was used.

 

FINDINGS: 

 

LUNG BASES: No significant abnormality is appreciated.

 

LIVER/GB: No significant abnormality is appreciated.

 

PANCREAS: No significant abnormality is seen.

 

SPLEEN: No significant abnormality is seen.

 

ADRENALS: No significant abnormality is seen.

 

KIDNEYS: No significant abnormality is seen.

 

FREE AIR:  No free air is visualized.

 

RETROPERITONEAL ADENOPATHY:  None visualized

 

REPRODUCTIVE ORGANS: No significant abnormality is seen

 

URINARY BLADDER:  No significant abnormality is seen.

 

PELVIC ADENOPATHY:  None visualized.

 

OSSEOUS STRUCTURES:  Moderate degenerative narrowing of both hip joints is redemonstrated.

 

BOWEL:  Left-sided colostomy is redemonstrated. There is rectal stump. No abnormal small or large bow
el dilatation is seen.

 

OTHER: Stable tiny fat-containing left inguinal hernia. 

 

IMPRESSION: 

NO ACUTE FINDINGS ARE SEEN TO ACCOUNT FOR PATIENT'S SYMPTOMS.

 

 

 

 

[facility], Workstation: [id number], 1/7/2025 12:52 PM